# Patient Record
Sex: FEMALE | Race: BLACK OR AFRICAN AMERICAN | NOT HISPANIC OR LATINO | Employment: OTHER | URBAN - METROPOLITAN AREA
[De-identification: names, ages, dates, MRNs, and addresses within clinical notes are randomized per-mention and may not be internally consistent; named-entity substitution may affect disease eponyms.]

---

## 2017-07-14 ENCOUNTER — COMMUNICATION - HEALTHEAST (OUTPATIENT)
Dept: INTERNAL MEDICINE | Facility: CLINIC | Age: 62
End: 2017-07-14

## 2017-07-14 DIAGNOSIS — Z79.899 MEDICATION MANAGEMENT: ICD-10-CM

## 2017-10-10 ENCOUNTER — COMMUNICATION - HEALTHEAST (OUTPATIENT)
Dept: INTERNAL MEDICINE | Facility: CLINIC | Age: 62
End: 2017-10-10

## 2017-10-10 DIAGNOSIS — E78.5 HYPERLIPIDEMIA: ICD-10-CM

## 2017-10-11 ENCOUNTER — COMMUNICATION - HEALTHEAST (OUTPATIENT)
Dept: INTERNAL MEDICINE | Facility: CLINIC | Age: 62
End: 2017-10-11

## 2017-10-11 DIAGNOSIS — Z79.899 MEDICATION MANAGEMENT: ICD-10-CM

## 2017-11-03 ENCOUNTER — RECORDS - HEALTHEAST (OUTPATIENT)
Dept: ADMINISTRATIVE | Facility: OTHER | Age: 62
End: 2017-11-03

## 2017-11-29 ENCOUNTER — OFFICE VISIT - HEALTHEAST (OUTPATIENT)
Dept: INTERNAL MEDICINE | Facility: CLINIC | Age: 62
End: 2017-11-29

## 2017-11-29 DIAGNOSIS — I10 ESSENTIAL HYPERTENSION: ICD-10-CM

## 2017-11-29 DIAGNOSIS — Z78.0 MENOPAUSE: ICD-10-CM
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
## 2017-11-29 DIAGNOSIS — E78.5 HYPERLIPIDEMIA: ICD-10-CM

## 2017-11-29 DIAGNOSIS — Z79.899 MEDICATION MANAGEMENT: ICD-10-CM

## 2017-11-29 DIAGNOSIS — G11.9 PRIMARY CEREBELLAR DEGENERATION (H): ICD-10-CM

## 2017-11-29 DIAGNOSIS — E11.9 TYPE 2 DIABETES MELLITUS WITHOUT COMPLICATION (H): ICD-10-CM

## 2017-11-29 DIAGNOSIS — Z00.00 PREVENTATIVE HEALTH CARE: ICD-10-CM

## 2017-11-29 DIAGNOSIS — E78.00 HYPERCHOLESTEROLEMIA: ICD-10-CM

## 2017-11-29 LAB
CHOLEST SERPL-MCNC: 184 MG/DL
FASTING STATUS PATIENT QL REPORTED: YES
HBA1C MFR BLD: 6.3 % (ref 3.5–6)
HDLC SERPL-MCNC: 61 MG/DL
LDLC SERPL CALC-MCNC: 106 MG/DL
TRIGL SERPL-MCNC: 86 MG/DL

## 2017-11-29 ASSESSMENT — MIFFLIN-ST. JEOR: SCORE: 1375.03

## 2017-11-30 ENCOUNTER — COMMUNICATION - HEALTHEAST (OUTPATIENT)
Dept: INTERNAL MEDICINE | Facility: CLINIC | Age: 62
End: 2017-11-30

## 2017-12-13 ENCOUNTER — COMMUNICATION - HEALTHEAST (OUTPATIENT)
Dept: INTERNAL MEDICINE | Facility: CLINIC | Age: 62
End: 2017-12-13

## 2017-12-20 ENCOUNTER — RECORDS - HEALTHEAST (OUTPATIENT)
Dept: ADMINISTRATIVE | Facility: OTHER | Age: 62
End: 2017-12-20

## 2017-12-20 ENCOUNTER — RECORDS - HEALTHEAST (OUTPATIENT)
Dept: BONE DENSITY | Facility: CLINIC | Age: 62
End: 2017-12-20

## 2017-12-20 ENCOUNTER — RECORDS - HEALTHEAST (OUTPATIENT)
Dept: MAMMOGRAPHY | Facility: CLINIC | Age: 62
End: 2017-12-20

## 2017-12-20 DIAGNOSIS — Z78.0 ASYMPTOMATIC MENOPAUSAL STATE: ICD-10-CM

## 2017-12-20 DIAGNOSIS — Z00.00 ENCOUNTER FOR GENERAL ADULT MEDICAL EXAMINATION WITHOUT ABNORMAL FINDINGS: ICD-10-CM

## 2017-12-27 ENCOUNTER — COMMUNICATION - HEALTHEAST (OUTPATIENT)
Dept: INTERNAL MEDICINE | Facility: CLINIC | Age: 62
End: 2017-12-27

## 2018-01-03 ENCOUNTER — COMMUNICATION - HEALTHEAST (OUTPATIENT)
Dept: INTERNAL MEDICINE | Facility: CLINIC | Age: 63
End: 2018-01-03

## 2018-01-08 ENCOUNTER — RECORDS - HEALTHEAST (OUTPATIENT)
Dept: ADMINISTRATIVE | Facility: OTHER | Age: 63
End: 2018-01-08

## 2018-01-09 ENCOUNTER — RECORDS - HEALTHEAST (OUTPATIENT)
Dept: ADMINISTRATIVE | Facility: OTHER | Age: 63
End: 2018-01-09

## 2018-09-19 ENCOUNTER — COMMUNICATION - HEALTHEAST (OUTPATIENT)
Dept: INTERNAL MEDICINE | Facility: CLINIC | Age: 63
End: 2018-09-19

## 2018-10-31 ENCOUNTER — COMMUNICATION - HEALTHEAST (OUTPATIENT)
Dept: TELEHEALTH | Facility: CLINIC | Age: 63
End: 2018-10-31

## 2018-10-31 ENCOUNTER — OFFICE VISIT - HEALTHEAST (OUTPATIENT)
Dept: INTERNAL MEDICINE | Facility: CLINIC | Age: 63
End: 2018-10-31

## 2018-10-31 DIAGNOSIS — E11.9 DIABETES MELLITUS, TYPE 2 (H): ICD-10-CM

## 2018-10-31 DIAGNOSIS — R42 VERTIGO: ICD-10-CM

## 2018-10-31 DIAGNOSIS — G11.9 PRIMARY CEREBELLAR DEGENERATION (H): ICD-10-CM

## 2018-10-31 DIAGNOSIS — I10 ESSENTIAL HYPERTENSION: ICD-10-CM

## 2018-10-31 DIAGNOSIS — Z79.899 MEDICATION MANAGEMENT: ICD-10-CM

## 2018-10-31 DIAGNOSIS — M25.562 BILATERAL KNEE PAIN: ICD-10-CM

## 2018-10-31 DIAGNOSIS — M25.561 BILATERAL KNEE PAIN: ICD-10-CM

## 2018-10-31 DIAGNOSIS — M25.552 HIP PAIN, LEFT: ICD-10-CM

## 2018-10-31 DIAGNOSIS — E78.00 HYPERCHOLESTEROLEMIA: ICD-10-CM

## 2018-10-31 DIAGNOSIS — M94.9 DISORDER OF BONE AND CARTILAGE: ICD-10-CM

## 2018-10-31 DIAGNOSIS — M89.9 DISORDER OF BONE AND CARTILAGE: ICD-10-CM

## 2018-10-31 DIAGNOSIS — E78.01 FAMILIAL HYPERCHOLESTEROLEMIA: ICD-10-CM

## 2018-10-31 LAB
ALBUMIN SERPL-MCNC: 4.1 G/DL (ref 3.5–5)
ALP SERPL-CCNC: 40 U/L (ref 45–120)
ALT SERPL W P-5'-P-CCNC: 23 U/L (ref 0–45)
ANION GAP SERPL CALCULATED.3IONS-SCNC: 11 MMOL/L (ref 5–18)
AST SERPL W P-5'-P-CCNC: 20 U/L (ref 0–40)
BILIRUB SERPL-MCNC: 0.7 MG/DL (ref 0–1)
BUN SERPL-MCNC: 12 MG/DL (ref 8–22)
CALCIUM SERPL-MCNC: 10.4 MG/DL (ref 8.5–10.5)
CHLORIDE BLD-SCNC: 103 MMOL/L (ref 98–107)
CHOLEST SERPL-MCNC: 187 MG/DL
CO2 SERPL-SCNC: 26 MMOL/L (ref 22–31)
CREAT SERPL-MCNC: 1.1 MG/DL (ref 0.6–1.1)
ERYTHROCYTE [DISTWIDTH] IN BLOOD BY AUTOMATED COUNT: 12.3 % (ref 11–14.5)
GFR SERPL CREATININE-BSD FRML MDRD: 50 ML/MIN/1.73M2
GLUCOSE BLD-MCNC: 127 MG/DL (ref 70–125)
HBA1C MFR BLD: 6.4 % (ref 3.5–6)
HCT VFR BLD AUTO: 43 % (ref 35–47)
HDLC SERPL-MCNC: 66 MG/DL
HGB BLD-MCNC: 14.6 G/DL (ref 12–16)
LDLC SERPL CALC-MCNC: 95 MG/DL
MCH RBC QN AUTO: 29.9 PG (ref 27–34)
MCHC RBC AUTO-ENTMCNC: 33.8 G/DL (ref 32–36)
MCV RBC AUTO: 88 FL (ref 80–100)
PLATELET # BLD AUTO: 200 THOU/UL (ref 140–440)
PMV BLD AUTO: 8.2 FL (ref 7–10)
POTASSIUM BLD-SCNC: 4.3 MMOL/L (ref 3.5–5)
PROT SERPL-MCNC: 7.1 G/DL (ref 6–8)
RBC # BLD AUTO: 4.87 MILL/UL (ref 3.8–5.4)
SODIUM SERPL-SCNC: 140 MMOL/L (ref 136–145)
TRIGL SERPL-MCNC: 131 MG/DL
TSH SERPL DL<=0.005 MIU/L-ACNC: 0.74 UIU/ML (ref 0.3–5)
WBC: 4.2 THOU/UL (ref 4–11)

## 2018-11-01 ENCOUNTER — COMMUNICATION - HEALTHEAST (OUTPATIENT)
Dept: INTERNAL MEDICINE | Facility: CLINIC | Age: 63
End: 2018-11-01

## 2018-11-01 LAB — 25(OH)D3 SERPL-MCNC: 32.1 NG/ML (ref 30–80)

## 2018-12-05 ENCOUNTER — OFFICE VISIT - HEALTHEAST (OUTPATIENT)
Dept: INTERNAL MEDICINE | Facility: CLINIC | Age: 63
End: 2018-12-05

## 2018-12-05 DIAGNOSIS — E78.00 HYPERCHOLESTEROLEMIA: ICD-10-CM

## 2018-12-05 DIAGNOSIS — Z00.00 PREVENTATIVE HEALTH CARE: ICD-10-CM

## 2018-12-05 DIAGNOSIS — G11.9 PRIMARY CEREBELLAR DEGENERATION (H): ICD-10-CM

## 2018-12-05 DIAGNOSIS — E11.9 TYPE 2 DIABETES MELLITUS WITHOUT COMPLICATION, WITHOUT LONG-TERM CURRENT USE OF INSULIN (H): ICD-10-CM

## 2018-12-05 ASSESSMENT — MIFFLIN-ST. JEOR: SCORE: 1346.11

## 2018-12-27 ENCOUNTER — COMMUNICATION - HEALTHEAST (OUTPATIENT)
Dept: INTERNAL MEDICINE | Facility: CLINIC | Age: 63
End: 2018-12-27

## 2019-01-10 ENCOUNTER — COMMUNICATION - HEALTHEAST (OUTPATIENT)
Dept: INTERNAL MEDICINE | Facility: CLINIC | Age: 64
End: 2019-01-10

## 2019-01-10 DIAGNOSIS — R26.9 ABNORMAL GAIT: ICD-10-CM

## 2019-02-04 ENCOUNTER — COMMUNICATION - HEALTHEAST (OUTPATIENT)
Dept: INTERNAL MEDICINE | Facility: CLINIC | Age: 64
End: 2019-02-04

## 2019-02-04 DIAGNOSIS — Z00.00 ROUTINE HEALTH MAINTENANCE: ICD-10-CM

## 2019-02-07 ENCOUNTER — RECORDS - HEALTHEAST (OUTPATIENT)
Dept: ADMINISTRATIVE | Facility: OTHER | Age: 64
End: 2019-02-07

## 2019-03-13 ENCOUNTER — COMMUNICATION - HEALTHEAST (OUTPATIENT)
Dept: INTERNAL MEDICINE | Facility: CLINIC | Age: 64
End: 2019-03-13

## 2019-03-13 DIAGNOSIS — E78.01 FAMILIAL HYPERCHOLESTEROLEMIA: ICD-10-CM

## 2019-03-18 ENCOUNTER — COMMUNICATION - HEALTHEAST (OUTPATIENT)
Dept: INTERNAL MEDICINE | Facility: CLINIC | Age: 64
End: 2019-03-18

## 2019-03-18 DIAGNOSIS — Z79.899 MEDICATION MANAGEMENT: ICD-10-CM

## 2019-04-23 ENCOUNTER — COMMUNICATION - HEALTHEAST (OUTPATIENT)
Dept: INTERNAL MEDICINE | Facility: CLINIC | Age: 64
End: 2019-04-23

## 2019-04-24 ENCOUNTER — AMBULATORY - HEALTHEAST (OUTPATIENT)
Dept: LAB | Facility: CLINIC | Age: 64
End: 2019-04-24

## 2019-04-24 ENCOUNTER — AMBULATORY - HEALTHEAST (OUTPATIENT)
Dept: MULTI SPECIALTY CLINIC | Facility: CLINIC | Age: 64
End: 2019-04-24

## 2019-04-24 DIAGNOSIS — H20.021 RECURRENT ACUTE IRIDOCYCLITIS OF RIGHT EYE: ICD-10-CM

## 2019-04-24 LAB
BASOPHILS # BLD AUTO: 0 THOU/UL (ref 0–0.2)
BASOPHILS NFR BLD AUTO: 0 % (ref 0–2)
EOSINOPHIL # BLD AUTO: 0.1 THOU/UL (ref 0–0.4)
EOSINOPHIL NFR BLD AUTO: 2 % (ref 0–6)
ERYTHROCYTE [DISTWIDTH] IN BLOOD BY AUTOMATED COUNT: 12.5 % (ref 11–14.5)
ERYTHROCYTE [SEDIMENTATION RATE] IN BLOOD BY WESTERGREN METHOD: 10 MM/HR (ref 0–20)
HCT VFR BLD AUTO: 44.5 % (ref 35–47)
HGB BLD-MCNC: 15.1 G/DL (ref 12–16)
LYMPHOCYTES # BLD AUTO: 1.9 THOU/UL (ref 0.8–4.4)
LYMPHOCYTES NFR BLD AUTO: 45 % (ref 20–40)
MCH RBC QN AUTO: 30.7 PG (ref 27–34)
MCHC RBC AUTO-ENTMCNC: 33.8 G/DL (ref 32–36)
MCV RBC AUTO: 91 FL (ref 80–100)
MONOCYTES # BLD AUTO: 0.2 THOU/UL (ref 0–0.9)
MONOCYTES NFR BLD AUTO: 5 % (ref 2–10)
NEUTROPHILS # BLD AUTO: 2 THOU/UL (ref 2–7.7)
NEUTROPHILS NFR BLD AUTO: 48 % (ref 50–70)
PLATELET # BLD AUTO: 224 THOU/UL (ref 140–440)
PMV BLD AUTO: 7.9 FL (ref 7–10)
RBC # BLD AUTO: 4.9 MILL/UL (ref 3.8–5.4)
RHEUMATOID FACT SERPL-ACNC: 16.6 IU/ML (ref 0–30)
WBC: 4.2 THOU/UL (ref 4–11)

## 2019-04-25 LAB
ANA SER QL: 0.5 U
B BURGDOR IGG+IGM SER QL: 0.65 INDEX VALUE
T PALLIDUM AB SER QL: NEGATIVE

## 2019-04-26 LAB — ACE SERPL-CCNC: 27 U/L (ref 9–67)

## 2019-04-29 LAB — ARUP MISCELLANEOUS TEST: NORMAL

## 2019-05-01 LAB
MISCELLANEOUS TEST DEPT. - HE HISTORICAL: NORMAL
PERFORMING LAB: NORMAL
SPECIMEN STATUS: NORMAL
TEST NAME: NORMAL

## 2019-05-03 ENCOUNTER — COMMUNICATION - HEALTHEAST (OUTPATIENT)
Dept: INTERNAL MEDICINE | Facility: CLINIC | Age: 64
End: 2019-05-03

## 2019-05-06 ENCOUNTER — COMMUNICATION - HEALTHEAST (OUTPATIENT)
Dept: INTERNAL MEDICINE | Facility: CLINIC | Age: 64
End: 2019-05-06

## 2019-06-05 ENCOUNTER — OFFICE VISIT - HEALTHEAST (OUTPATIENT)
Dept: INTERNAL MEDICINE | Facility: CLINIC | Age: 64
End: 2019-06-05

## 2019-06-05 DIAGNOSIS — E11.9 TYPE 2 DIABETES MELLITUS (H): ICD-10-CM

## 2019-06-05 DIAGNOSIS — H20.9 IRITIS: ICD-10-CM

## 2019-06-05 DIAGNOSIS — G11.9 PRIMARY CEREBELLAR DEGENERATION (H): ICD-10-CM

## 2019-06-05 LAB
ALBUMIN SERPL-MCNC: 4 G/DL (ref 3.5–5)
ALP SERPL-CCNC: 40 U/L (ref 45–120)
ALT SERPL W P-5'-P-CCNC: 21 U/L (ref 0–45)
ANION GAP SERPL CALCULATED.3IONS-SCNC: 5 MMOL/L (ref 5–18)
AST SERPL W P-5'-P-CCNC: 18 U/L (ref 0–40)
BILIRUB SERPL-MCNC: 0.6 MG/DL (ref 0–1)
BUN SERPL-MCNC: 14 MG/DL (ref 8–22)
CALCIUM SERPL-MCNC: 10.3 MG/DL (ref 8.5–10.5)
CHLORIDE BLD-SCNC: 104 MMOL/L (ref 98–107)
CO2 SERPL-SCNC: 30 MMOL/L (ref 22–31)
CREAT SERPL-MCNC: 0.99 MG/DL (ref 0.6–1.1)
CREAT UR-MCNC: 147.7 MG/DL
GFR SERPL CREATININE-BSD FRML MDRD: 56 ML/MIN/1.73M2
GLUCOSE BLD-MCNC: 74 MG/DL (ref 70–125)
HBA1C MFR BLD: 6.4 % (ref 3.5–6)
MICROALBUMIN UR-MCNC: 0.79 MG/DL (ref 0–1.99)
MICROALBUMIN/CREAT UR: 5.3 MG/G
POTASSIUM BLD-SCNC: 3.8 MMOL/L (ref 3.5–5)
PROT SERPL-MCNC: 6.8 G/DL (ref 6–8)
SODIUM SERPL-SCNC: 139 MMOL/L (ref 136–145)

## 2019-06-05 ASSESSMENT — MIFFLIN-ST. JEOR: SCORE: 1348.83

## 2019-07-12 ENCOUNTER — COMMUNICATION - HEALTHEAST (OUTPATIENT)
Dept: INTERNAL MEDICINE | Facility: CLINIC | Age: 64
End: 2019-07-12

## 2019-09-19 ENCOUNTER — RECORDS - HEALTHEAST (OUTPATIENT)
Dept: ADMINISTRATIVE | Facility: OTHER | Age: 64
End: 2019-09-19

## 2020-01-02 ENCOUNTER — COMMUNICATION - HEALTHEAST (OUTPATIENT)
Dept: SCHEDULING | Facility: CLINIC | Age: 65
End: 2020-01-02

## 2020-01-28 ENCOUNTER — MEDICAL CORRESPONDENCE (OUTPATIENT)
Dept: HEALTH INFORMATION MANAGEMENT | Facility: CLINIC | Age: 65
End: 2020-01-28

## 2020-01-28 ENCOUNTER — OFFICE VISIT - HEALTHEAST (OUTPATIENT)
Dept: INTERNAL MEDICINE | Facility: CLINIC | Age: 65
End: 2020-01-28

## 2020-01-28 DIAGNOSIS — Z79.899 ENCOUNTER FOR LONG-TERM (CURRENT) USE OF MEDICATIONS: ICD-10-CM

## 2020-01-28 DIAGNOSIS — E11.9 TYPE 2 DIABETES MELLITUS WITHOUT COMPLICATION, WITHOUT LONG-TERM CURRENT USE OF INSULIN (H): ICD-10-CM

## 2020-01-28 DIAGNOSIS — E78.01 FAMILIAL HYPERCHOLESTEROLEMIA: ICD-10-CM

## 2020-01-28 DIAGNOSIS — R73.01 IMPAIRED FASTING GLUCOSE: ICD-10-CM

## 2020-01-28 DIAGNOSIS — R55 VASOVAGAL SYNCOPE: ICD-10-CM

## 2020-01-28 DIAGNOSIS — G11.9 PRIMARY CEREBELLAR DEGENERATION (H): ICD-10-CM

## 2020-01-28 DIAGNOSIS — R55 VASOVAGAL SYNCOPE: Primary | ICD-10-CM

## 2020-01-28 DIAGNOSIS — Z79.899 MEDICATION MANAGEMENT: ICD-10-CM

## 2020-01-28 LAB
ERYTHROCYTE [DISTWIDTH] IN BLOOD BY AUTOMATED COUNT: 11.2 % (ref 11–14.5)
HBA1C MFR BLD: 6.4 % (ref 3.5–6)
HCT VFR BLD AUTO: 44.4 % (ref 35–47)
HGB BLD-MCNC: 15.1 G/DL (ref 12–16)
MCH RBC QN AUTO: 31.3 PG (ref 27–34)
MCHC RBC AUTO-ENTMCNC: 34 G/DL (ref 32–36)
MCV RBC AUTO: 92 FL (ref 80–100)
PLATELET # BLD AUTO: 232 THOU/UL (ref 140–440)
PMV BLD AUTO: 7.4 FL (ref 7–10)
RBC # BLD AUTO: 4.82 MILL/UL (ref 3.8–5.4)
WBC: 3.9 THOU/UL (ref 4–11)

## 2020-01-29 LAB
ALBUMIN SERPL-MCNC: 4.1 G/DL (ref 3.5–5)
ALP SERPL-CCNC: 45 U/L (ref 45–120)
ALT SERPL W P-5'-P-CCNC: 23 U/L (ref 0–45)
ANION GAP SERPL CALCULATED.3IONS-SCNC: 12 MMOL/L (ref 5–18)
AST SERPL W P-5'-P-CCNC: 21 U/L (ref 0–40)
BILIRUB DIRECT SERPL-MCNC: 0.2 MG/DL
BILIRUB SERPL-MCNC: 0.4 MG/DL (ref 0–1)
BUN SERPL-MCNC: 12 MG/DL (ref 8–22)
CALCIUM SERPL-MCNC: 10.8 MG/DL (ref 8.5–10.5)
CHLORIDE BLD-SCNC: 104 MMOL/L (ref 98–107)
CO2 SERPL-SCNC: 27 MMOL/L (ref 22–31)
CREAT SERPL-MCNC: 1.1 MG/DL (ref 0.6–1.1)
GFR SERPL CREATININE-BSD FRML MDRD: 50 ML/MIN/1.73M2
GLUCOSE BLD-MCNC: 69 MG/DL (ref 70–125)
POTASSIUM BLD-SCNC: 4 MMOL/L (ref 3.5–5)
PROT SERPL-MCNC: 7.3 G/DL (ref 6–8)
SODIUM SERPL-SCNC: 143 MMOL/L (ref 136–145)

## 2020-01-31 ENCOUNTER — COMMUNICATION - HEALTHEAST (OUTPATIENT)
Dept: INTERNAL MEDICINE | Facility: CLINIC | Age: 65
End: 2020-01-31

## 2020-11-28 ENCOUNTER — COMMUNICATION - HEALTHEAST (OUTPATIENT)
Dept: INTERNAL MEDICINE | Facility: CLINIC | Age: 65
End: 2020-11-28

## 2020-11-28 DIAGNOSIS — Z79.899 MEDICATION MANAGEMENT: ICD-10-CM

## 2020-11-28 DIAGNOSIS — E78.01 FAMILIAL HYPERCHOLESTEROLEMIA: ICD-10-CM

## 2021-02-26 ENCOUNTER — COMMUNICATION - HEALTHEAST (OUTPATIENT)
Dept: ADMINISTRATIVE | Facility: CLINIC | Age: 66
End: 2021-02-26

## 2021-04-08 ENCOUNTER — RECORDS - HEALTHEAST (OUTPATIENT)
Dept: ADMINISTRATIVE | Facility: OTHER | Age: 66
End: 2021-04-08

## 2021-05-05 ENCOUNTER — OFFICE VISIT - HEALTHEAST (OUTPATIENT)
Dept: INTERNAL MEDICINE | Facility: CLINIC | Age: 66
End: 2021-05-05

## 2021-05-05 ENCOUNTER — COMMUNICATION - HEALTHEAST (OUTPATIENT)
Dept: INTERNAL MEDICINE | Facility: CLINIC | Age: 66
End: 2021-05-05

## 2021-05-05 DIAGNOSIS — G11.9 PRIMARY CEREBELLAR DEGENERATION (H): ICD-10-CM

## 2021-05-05 DIAGNOSIS — E11.9 TYPE 2 DIABETES MELLITUS WITHOUT COMPLICATION, WITHOUT LONG-TERM CURRENT USE OF INSULIN (H): ICD-10-CM

## 2021-05-05 DIAGNOSIS — E55.9 VITAMIN D DEFICIENCY: ICD-10-CM

## 2021-05-05 DIAGNOSIS — E89.40 ASYMPTOMATIC POSTPROCEDURAL OVARIAN FAILURE: ICD-10-CM

## 2021-05-05 DIAGNOSIS — N18.31 STAGE 3A CHRONIC KIDNEY DISEASE (H): ICD-10-CM

## 2021-05-05 DIAGNOSIS — I10 ESSENTIAL HYPERTENSION: ICD-10-CM

## 2021-05-05 DIAGNOSIS — R29.6 FALLS FREQUENTLY: ICD-10-CM

## 2021-05-05 DIAGNOSIS — Z00.00 ENCOUNTER FOR PREVENTIVE CARE: ICD-10-CM

## 2021-05-05 LAB
ALBUMIN SERPL-MCNC: 4.2 G/DL (ref 3.5–5)
ALP SERPL-CCNC: 44 U/L (ref 45–120)
ALT SERPL W P-5'-P-CCNC: 38 U/L (ref 0–45)
ANION GAP SERPL CALCULATED.3IONS-SCNC: 9 MMOL/L (ref 5–18)
AST SERPL W P-5'-P-CCNC: 27 U/L (ref 0–40)
BILIRUB SERPL-MCNC: 0.6 MG/DL (ref 0–1)
BUN SERPL-MCNC: 12 MG/DL (ref 8–22)
CALCIUM SERPL-MCNC: 10.1 MG/DL (ref 8.5–10.5)
CHLORIDE BLD-SCNC: 103 MMOL/L (ref 98–107)
CO2 SERPL-SCNC: 28 MMOL/L (ref 22–31)
CREAT SERPL-MCNC: 1.16 MG/DL (ref 0.6–1.1)
CREAT UR-MCNC: 255 MG/DL
ERYTHROCYTE [DISTWIDTH] IN BLOOD BY AUTOMATED COUNT: 15.2 % (ref 11–14.5)
GFR SERPL CREATININE-BSD FRML MDRD: 47 ML/MIN/1.73M2
GLUCOSE BLD-MCNC: 104 MG/DL (ref 70–125)
HBA1C MFR BLD: 6.3 %
HCT VFR BLD AUTO: 46.8 % (ref 35–47)
HGB BLD-MCNC: 15.1 G/DL (ref 12–16)
MCH RBC QN AUTO: 29.1 PG (ref 27–34)
MCHC RBC AUTO-ENTMCNC: 32.3 G/DL (ref 32–36)
MCV RBC AUTO: 90 FL (ref 80–100)
MICROALBUMIN UR-MCNC: 1.54 MG/DL (ref 0–1.99)
MICROALBUMIN/CREAT UR: 6 MG/G
PLATELET # BLD AUTO: 198 THOU/UL (ref 140–440)
PMV BLD AUTO: 9.5 FL (ref 7–10)
POTASSIUM BLD-SCNC: 4 MMOL/L (ref 3.5–5)
PROT SERPL-MCNC: 7.3 G/DL (ref 6–8)
RBC # BLD AUTO: 5.19 MILL/UL (ref 3.8–5.4)
SODIUM SERPL-SCNC: 140 MMOL/L (ref 136–145)
TSH SERPL DL<=0.005 MIU/L-ACNC: 0.62 UIU/ML (ref 0.3–5)
VIT B12 SERPL-MCNC: 659 PG/ML (ref 213–816)
WBC: 4.8 THOU/UL (ref 4–11)

## 2021-05-05 ASSESSMENT — MIFFLIN-ST. JEOR: SCORE: 1392.49

## 2021-05-06 LAB
25(OH)D3 SERPL-MCNC: 56.1 NG/ML (ref 30–80)
CHOLEST SERPL-MCNC: 193 MG/DL
HCV AB SERPL QL IA: NEGATIVE
HDLC SERPL-MCNC: 64 MG/DL
LDLC SERPL CALC-MCNC: 106 MG/DL
TRIGL SERPL-MCNC: 114 MG/DL

## 2021-05-26 ENCOUNTER — RECORDS - HEALTHEAST (OUTPATIENT)
Dept: ADMINISTRATIVE | Facility: OTHER | Age: 66
End: 2021-05-26

## 2021-05-26 ENCOUNTER — RECORDS - HEALTHEAST (OUTPATIENT)
Dept: BONE DENSITY | Facility: CLINIC | Age: 66
End: 2021-05-26

## 2021-05-26 DIAGNOSIS — E55.9 VITAMIN D DEFICIENCY, UNSPECIFIED: ICD-10-CM

## 2021-05-26 DIAGNOSIS — G11.9 HEREDITARY ATAXIA, UNSPECIFIED (H): ICD-10-CM

## 2021-05-26 DIAGNOSIS — R29.6 REPEATED FALLS: ICD-10-CM

## 2021-05-26 DIAGNOSIS — E89.40 ASYMPTOMATIC POSTPROCEDURAL OVARIAN FAILURE: ICD-10-CM

## 2021-05-27 ENCOUNTER — RECORDS - HEALTHEAST (OUTPATIENT)
Dept: ADMINISTRATIVE | Facility: CLINIC | Age: 66
End: 2021-05-27

## 2021-05-27 VITALS
WEIGHT: 183.6 LBS | DIASTOLIC BLOOD PRESSURE: 70 MMHG | HEIGHT: 67 IN | OXYGEN SATURATION: 99 % | BODY MASS INDEX: 28.82 KG/M2 | SYSTOLIC BLOOD PRESSURE: 130 MMHG | HEART RATE: 88 BPM

## 2021-05-28 NOTE — TELEPHONE ENCOUNTER
Please call pt to schedule DM follow up with PCP     Pt can come in for lab only if they would like to come in for labs before appointment

## 2021-05-29 NOTE — PROGRESS NOTES
[facility]    Assessment/Plan:  1. Type 2 diabetes mellitus (H)  Glycohemoglobin at 6.4.  Diet controlled.  On simvastatin, aspirin and blood pressure at goal.  Labs as below.  She is getting regular ophthalmologic care as well.  Recommendations: Continue the same.  - Glycosylated Hemoglobin A1C  - Microalbumin, Random Urine  - Comprehensive Metabolic Panel    2. Primary Cerebellar Degeneration  Followed by neurology.  Of note, this appears to be familial and that her mother and sisters all have this.  However, with recent recurrent iritis, one wonders whether there could be an autoimmune component.  Autoimmune laboratory studies are within normal limits.  Nonetheless, at her convenience, would refer to rheumatology  - Ambulatory referral to Rheumatology    3. Iritis  Ophthalmology notes reviewed.  Consider rheumatologic evaluation  - Ambulatory referral to Rheumatology      Follow-up as needed and quarterly    Cata Doll MD    Chief Complaint:  Chief Complaint   Patient presents with     Follow-up     Diabetes       History of Present Illness:  Rema is a 64 y.o. female who is here today for diabetic follow-up.  Of note, she is a delightful female who has a history of cerebellar degeneration.  This appears to be familial.  She is recently followed up with her neurologist.  She is currently undergoing both physical therapy and will follow up with therapy at the [facility].  She states that doing these 2 therapies simultaneously was very difficult for her.  She is going to stagger them.  Additionally, she reports that she has had recurrent iritis.  The notes from her ophthalmologist are reviewed.  She has had some connective tissue laboratory studies done here that were unremarkable.  Nonetheless, the symptoms have recurred.  She has been on steroid eyedrops.    Her family history is negative for autoimmune disease.  However her mother and 2 sisters have  "had cerebellar degeneration that is progressive.    Additionally, she has type 2 diabetes.  She has been managing with diet control.    Review of Systems:  A comprehensive review of systems was performed and was otherwise negative    PFSH:  Social History: She is single.  She has one her case against the state of [place].  She does not have to pay a fine.  Social History     Tobacco Use   Smoking Status Former Smoker     Last attempt to quit: 1989     Years since quittin.2   Smokeless Tobacco Never Used       Past History: No past medical history on file.    Current Outpatient Medications   Medication Sig Dispense Refill     aspirin 81 mg chewable tablet Chew 81 mg daily.       calcium-vitamin D 500 mg(1,250mg) -200 unit per tablet Take 1 tablet by mouth daily.       cholecalciferol, vitamin D3, (VITAMIN D3) 1,000 unit capsule Take 1,000 Units by mouth daily.       omega-3 fatty acids-vitamin E (FISH OIL) 1,000 mg cap 1 capsule daily.        simvastatin (ZOCOR) 40 MG tablet TAKE 1 TABLET BY MOUTH AT BEDTIME 90 tablet 2     triamterene-hydrochlorothiazide (DYAZIDE) 37.5-25 mg per capsule TAKE ONE CAPSULE BY MOUTH EVERY DAY 90 capsule 1     TURMERIC ORAL Take by mouth.       No current facility-administered medications for this visit.        Family History: Cerebellar degeneration as described    Physical Exam:  General Appearance:   He appears quite well.  She is beaming and vibrant today  Vitals:    19 1237   BP: 132/70   Patient Site: Left Arm   Patient Position: Sitting   Cuff Size: Adult Large   Pulse: 78   SpO2: 97%   Weight: 176 lb 9.6 oz (80.1 kg)   Height: 5' 5.75\" (1.67 m)     Wt Readings from Last 3 Encounters:   19 176 lb 9.6 oz (80.1 kg)   18 176 lb (79.8 kg)   10/31/18 181 lb (82.1 kg)     Body mass index is 28.72 kg/m .    Neck exam is negative  Lungs are clear to auscultation and percussion  Cardiac exam reveals regular rate and rhythm with no murmurs or gallops    Data " Review:    Analysis and Summary of Old Records (2): Viewed ophthalmology records    Records Requested (1):       Other History Summarized (from other people in the room) (2):     Radiology Tests Summarized (XRAY/CT/MRI/DXA) (1):     Labs Reviewed (1): Viewed labs today as well as from April    Medicine Tests Reviewed (EKG/ECHO/COLONOSCOPY/EGD) (1):     Independent Review of EKG or X-RAY (2):

## 2021-05-30 NOTE — TELEPHONE ENCOUNTER
Spoke with pt and gave her phone number for medical records to request those records be faxed to rheumatology.

## 2021-05-30 NOTE — TELEPHONE ENCOUNTER
Who is calling:  Patient   Reason for Call:  Patient is requesting to fax her medical records to rheumatology office in [place] . Fax # [phone].  Date of last appointment with primary care: 6/5/19  Okay to leave a detailed message: No

## 2021-05-31 ENCOUNTER — COMMUNICATION - HEALTHEAST (OUTPATIENT)
Dept: INTERNAL MEDICINE | Facility: CLINIC | Age: 66
End: 2021-05-31

## 2021-05-31 VITALS — WEIGHT: 185 LBS | BODY MASS INDEX: 30.82 KG/M2 | HEIGHT: 65 IN

## 2021-06-02 ENCOUNTER — RECORDS - HEALTHEAST (OUTPATIENT)
Dept: ADMINISTRATIVE | Facility: CLINIC | Age: 66
End: 2021-06-02

## 2021-06-02 VITALS — BODY MASS INDEX: 30.12 KG/M2 | WEIGHT: 181 LBS

## 2021-06-02 VITALS — BODY MASS INDEX: 28.28 KG/M2 | WEIGHT: 176 LBS | HEIGHT: 66 IN

## 2021-06-02 VITALS — HEIGHT: 66 IN | BODY MASS INDEX: 28.38 KG/M2 | WEIGHT: 176.6 LBS

## 2021-06-04 VITALS
BODY MASS INDEX: 28.75 KG/M2 | HEART RATE: 88 BPM | OXYGEN SATURATION: 98 % | DIASTOLIC BLOOD PRESSURE: 74 MMHG | SYSTOLIC BLOOD PRESSURE: 132 MMHG | WEIGHT: 176.8 LBS

## 2021-06-04 NOTE — TELEPHONE ENCOUNTER
"She is calling with \"symptoms of heat stroke\".  Had fainting spell and dizziness and called paramedics.    Happened omar rob.    No symptoms since Tuesday.  Had \"a hundred people over and the oven on\".    No dizziness now or currently symptoms.    She agrees to call back if symptoms return.    Yoko Fernandez RN Triage and refills    "

## 2021-06-05 ENCOUNTER — RECORDS - HEALTHEAST (OUTPATIENT)
Dept: BONE DENSITY | Facility: CLINIC | Age: 66
End: 2021-06-05

## 2021-06-05 DIAGNOSIS — M89.9 DISORDER OF BONE AND CARTILAGE: ICD-10-CM

## 2021-06-05 DIAGNOSIS — M94.9 DISORDER OF BONE AND CARTILAGE: ICD-10-CM

## 2021-06-05 NOTE — PROGRESS NOTES
Counts include 234 beds at the [facility] Follow Up Note    Assessment/Plan:  1. Vasovagal syncope  Syncopal event occurred on Dana day.  Likely secondary to crowded house, warm temperatures, and alcoholic beverage and dehydration.  No further occurrences.  However, history of cerebellar degeneration.  Some falls and occasional lightheadedness.  Recommendation: Keep hydrated.  Would like to proceed with both an echocardiogram and a carotid ultrasound to rule out any confounding factors.  Follow-up in 2 to 3 months  - HM2(CBC w/o Differential)  - Basic Metabolic Panel  - US Carotid Bilateral; Future  - Echo Complete; Future    2. Medication management  We will renew medications  - triamterene-hydrochlorothiazide (DYAZIDE) 37.5-25 mg per capsule; TAKE ONE CAPSULE BY MOUTH EVERY DAY  Dispense: 90 capsule; Refill: 3  - Hepatic Profile    3. Familial hypercholesterolemia  On Zocor.  Will renew medications  - simvastatin (ZOCOR) 40 MG tablet; TAKE 1 TABLET BY MOUTH AT BEDTIME  Dispense: 90 tablet; Refill: 3      4. Impaired fasting glucose/type 2 diabetes-with hyperglycemia and on no medications  Diet controlled type 2 diabetes with hyperglycemia.  Of note, she denies any lightheadedness or polyuria or polydipsia.  She has given up on alcohol and wine ingestion.  She is hoping her sugars will be improved.  Recommendation: We will update glycohemoglobin.  Consideration for diabetic education and an oral medication.  Will discuss at follow-up visit  - Glycosylated Hemoglobin A1c          Cata Doll MD    Chief Complaint:  Chief Complaint   Patient presents with     Dizziness     Follow-up       History of Present Illness:  Rema is a 64 y.o. female who is here today for follow-up after having an episode of syncope on Dana day.  She states that she was in her kitchen with a lot of people.  It was very crowded as she was entertaining gas.  She states the temperature was somewhat elevated as the oven was on.  She had her  "coat on and was ready to step outside.  She developed some lightheadedness with queasiness and had a controlled \"faint \".  She states she was not entirely unconscious.  She could hear people talking and yelling at her.  She states that when EMS arrived, her blood pressure was elevated.  She was drinking cold water and had ice packs on her head.  These things helped her to feel better.  She attributed her symptoms to just the heat and prolonged standing.  Of note, she did not have any associated headaches, chest pain etc.  She did not go to the hospital.  She has not had any further episodes.    She does have a history of cerebellar degeneration.  She states she has had some falls.  She did slip on the ice on one occasion.  She has occasional lightheadedness.  She has not been to the neurologist recently.    She is currently experiencing a recurrent episode of iritis.  She has not yet seen the rheumatologist    Her history is also significant for impaired fasting glucose/type 2 diabetes.  She is on an aspirin and statin medication.  She has not yet been started on any medications as she believes she can control this with diet alone.  She states she has given up alcohol.  She feels that her sugar should be better.    She is not able to exercise regularly.    Review of Systems:  A comprehensive review of systems was performed and was otherwise negative    PFSH:  Social History: She is single.  She has adult children.  Social History     Tobacco Use   Smoking Status Former Smoker     Last attempt to quit: 1989     Years since quittin.9   Smokeless Tobacco Never Used       Past History: No past medical history on file.    Current Outpatient Medications   Medication Sig Dispense Refill     aspirin 81 mg chewable tablet Chew 81 mg daily.       calcium-vitamin D 500 mg(1,250mg) -200 unit per tablet Take 1 tablet by mouth daily.       cholecalciferol, vitamin D3, (VITAMIN D3) 1,000 unit capsule Take 1,000 Units by " mouth daily.       simvastatin (ZOCOR) 40 MG tablet TAKE 1 TABLET BY MOUTH AT BEDTIME 90 tablet 3     triamterene-hydrochlorothiazide (DYAZIDE) 37.5-25 mg per capsule TAKE ONE CAPSULE BY MOUTH EVERY DAY 90 capsule 3     TURMERIC ORAL Take by mouth.       No current facility-administered medications for this visit.        Family History:     Physical Exam:  General Appearance:   She is well in appearance and in no acute distress  Vitals:    01/28/20 1240   BP: 132/74   Pulse: 88   SpO2: 98%   Weight: 176 lb 12.8 oz (80.2 kg)     Wt Readings from Last 3 Encounters:   01/28/20 176 lb 12.8 oz (80.2 kg)   06/05/19 176 lb 9.6 oz (80.1 kg)   12/05/18 176 lb (79.8 kg)     Body mass index is 28.75 kg/m .    EYES: Eyelids, conjunctiva, and sclera were normal. Pupils were normal. Cornea, iris, and lens were normal bilaterally.  HEAD, EARS, NOSE, MOUTH, AND THROAT: Head and face were normal. Hearing was normal to voice and the ears were normal to external exam. Nose appearance was normal and there was no discharge. Oropharynx was normal.  TMs were normal.  NECK: Neck appearance was normal. There were no neck masses and the thyroid was not enlarged.  RESPIRATORY: Breathing pattern was normal and the chest moved symmetrically.   Lung sounds were equal bilaterally.  CARDIOVASCULAR: Heart rate and rhythm were normal.  S1 and S2 were normal and there were no extra sounds or murmurs. Peripheral pulses in arms and legs were normal.  Jugular venous pressure was normal.  There was no peripheral edema.  GASTROINTESTINAL: The abdomen was normal in contour.  Bowel sounds were present.   Palpation detected no tenderness, mass, or enlarged organs.   MUSCULOSKELETAL: Skeletal configuration was normal and muscle mass was normal for age. Joint appearance was overall normal.  LYMPHATIC: There were no enlarged nodes.  SKIN/HAIR/NAILS: Skin color was normal.  There were no abnormal skin lesions.  Hair and nails were normal.  NEUROLOGIC: The  patient was alert and oriented to person, place, time, and circumstance. Speech was normal. Cranial nerves were normal. Motor strength was normal for age. The patient was normally coordinated.  PSYCHIATRIC:  Mood and affect were normal and the patient had normal recent and remote memory. The patient's judgment and insight were normal.          Data Review:    Analysis and Summary of Old Records (2): Reviewed care connection notes    Records Requested (1):       Other History Summarized (from other people in the room) (2):     Radiology Tests Summarized (XRAY/CT/MRI/DXA) (1):     Labs Reviewed (1): Ordered and reviewed labs.  Glycohemoglobin 6.4    Medicine Tests Reviewed (EKG/ECHO/COLONOSCOPY/EGD) (1): Ordered echo/carotid ultrasound    Independent Review of EKG or X-RAY (2):

## 2021-06-13 NOTE — TELEPHONE ENCOUNTER
Refill Approved    Rx renewed per Medication Renewal Policy. Medication was last renewed on 1/28/20, last OV 1/28/20.    Natty Vincent, Care Connection Triage/Med Refill 11/30/2020     Requested Prescriptions   Pending Prescriptions Disp Refills     simvastatin (ZOCOR) 40 MG tablet [Pharmacy Med Name: SIMVASTATIN 40 MG Tablet] 90 tablet 3     Sig: TAKE 1 TABLET AT BEDTIME       Statins Refill Protocol (Hmg CoA Reductase Inhibitors) Passed - 11/28/2020  1:29 PM        Passed - PCP or prescribing provider visit in past 12 months      Last office visit with prescriber/PCP: 1/28/2020 Cata Doll MD OR same dept: 1/28/2020 Cata Doll MD OR same specialty: 1/28/2020 Cata Doll MD  Last physical: 12/5/2018 Last MTM visit: Visit date not found   Next visit within 3 mo: Visit date not found  Next physical within 3 mo: Visit date not found  Prescriber OR PCP: Cata Doll MD  Last diagnosis associated with med order: 1. Familial hypercholesterolemia  - simvastatin (ZOCOR) 40 MG tablet [Pharmacy Med Name: SIMVASTATIN 40 MG Tablet]; TAKE 1 TABLET AT BEDTIME  Dispense: 90 tablet; Refill: 3    2. Medication management  - triamterene-hydrochlorothiazide (DYAZIDE) 37.5-25 mg per capsule [Pharmacy Med Name: TRIAMTERENE/HYDROCHLOROTHIAZIDE 37.5-25 MG Capsule]; TAKE 1 CAPSULE EVERY DAY  Dispense: 90 capsule; Refill: 3    If protocol passes may refill for 12 months if within 3 months of last provider visit (or a total of 15 months).                triamterene-hydrochlorothiazide (DYAZIDE) 37.5-25 mg per capsule [Pharmacy Med Name: TRIAMTERENE/HYDROCHLOROTHIAZIDE 37.5-25 MG Capsule] 90 capsule 3     Sig: TAKE 1 CAPSULE EVERY DAY       Diuretics/Combination Diuretics Refill Protocol  Passed - 11/28/2020  1:29 PM        Passed - Visit with PCP or prescribing provider visit in past 12 months     Last office visit with prescriber/PCP: 1/28/2020 Cata Doll MD OR same dept: 1/28/2020 Cata Doll  MD Vickie OR same specialty: 1/28/2020 Cata Doll MD  Last physical: 12/5/2018 Last MTM visit: Visit date not found   Next visit within 3 mo: Visit date not found  Next physical within 3 mo: Visit date not found  Prescriber OR PCP: Cata Doll MD  Last diagnosis associated with med order: 1. Familial hypercholesterolemia  - simvastatin (ZOCOR) 40 MG tablet [Pharmacy Med Name: SIMVASTATIN 40 MG Tablet]; TAKE 1 TABLET AT BEDTIME  Dispense: 90 tablet; Refill: 3    2. Medication management  - triamterene-hydrochlorothiazide (DYAZIDE) 37.5-25 mg per capsule [Pharmacy Med Name: TRIAMTERENE/HYDROCHLOROTHIAZIDE 37.5-25 MG Capsule]; TAKE 1 CAPSULE EVERY DAY  Dispense: 90 capsule; Refill: 3    If protocol passes may refill for 12 months if within 3 months of last provider visit (or a total of 15 months).             Passed - Serum Potassium in past 12 months      Lab Results   Component Value Date    Potassium 4.0 01/28/2020             Passed - Serum Sodium in past 12 months      Lab Results   Component Value Date    Sodium 143 01/28/2020             Passed - Blood pressure on file in past 12 months     BP Readings from Last 1 Encounters:   01/28/20 132/74             Passed - Serum Creatinine in past 12 months      Creatinine   Date Value Ref Range Status   01/28/2020 1.10 0.60 - 1.10 mg/dL Final

## 2021-06-14 NOTE — PROGRESS NOTES
Assessment and Plan:     1. Preventative health care  Due for mammography and bone densitometry.  Colonoscopy was updated in 2016.  Immunizations will be updated today.  Ophthalmologic care scheduled for the next couple of months.  Dental care is annually.  - Mammo Screening Bilateral; Future    2. Type 2 diabetes mellitus without complication  She is on no medications but has impaired fasting glucose/type 2 diabetes.  Will update labs.  Have discussed the importance of a healthy diet and eliminating simple sugars.  - Glycosylated Hemoglobin A1c    3. Hypertension  At goal    4. Hypercholesterolemia  We will update labs  - Comprehensive Metabolic Panel  - Lipid Cascade FASTING  - HM2(CBC w/o Differential)    5. Primary cerebellar degeneration  Approximately 4-5 falls per month.  She feels that her movement disorder is stable.  Recommendation: Encourage follow-up with neurology.  Will refer to physical therapy for further assessment of gait stability  - Vitamin D, Total (25-Hydroxy)  - Vitamin B12  - Ambulatory referral to Physical Therapy      Cata Doll MD  11/29/2017    Chief Complaint:  Chief Complaint   Patient presents with     Annual Exam       History of Present Illness:  Rema is a 62 y.o. female who is here today for her annual examination.  Of note, she has really no chief complaints today.  She does have a cerebellar degenerative disorder.  She states that she believes her gait is stable.  She falls about 4-5 times per month and this is consistent with what she has experienced in the past.  She did find physical therapy in the past helpful.  She would like to proceed with this again.    Health maintenance is reviewed.  She did have a colonoscopy done in 2016.  She is due for mammography and bone densitometry.  Immunizations will be updated today.  Ophthalmologic care is due.  Dental care is done annually.    She believes her diet is fairly healthy.  She does have desserts every couple of days.   "She is aware of the issue with type 2 diabetes.    Review of Systems:    The rest of the review of systems are negative for all systems.    PFSH:  Social History: She is single and lives in her own home.  She is retired  History   Smoking Status     Former Smoker     Quit date: 2/23/1989   Smokeless Tobacco     Not on file       Past Medical History:    No Known Allergies    Family History: Many family members have type 2 diabetes  Family History   Problem Relation Age of Onset     Goiter       Multinodular     Diabetes type II       Ataxia       Spinal Cerebellar Ataxia      Breast cancer Neg Hx      Colon cancer         Physical Exam:  Vitals:    11/29/17 1402   BP: 136/74   Patient Site: Left Arm   Patient Position: Sitting   Cuff Size: Adult Regular   Pulse: 72   Weight: 185 lb (83.9 kg)   Height: 5' 5\" (1.651 m)     Wt Readings from Last 3 Encounters:   11/29/17 185 lb (83.9 kg)   09/19/16 189 lb (85.7 kg)   07/21/16 183 lb (83 kg)       General Appearance:  Alert, cooperative, no distress, appears stated age   Head:  Normocephalic, without obvious abnormality, atraumatic   Eyes:  PERRL, conjunctiva/corneas clear, EOM's intact   Ears:  Normal TM's and external ear canals, both ears   Nose: Nares normal, septum midline,mucosa normal, no drainage    Throat: Lips, mucosa, and tongue normal; teeth and gums normal   Neck: Supple, symmetrical, trachea midline, no adenopathy;  thyroid: not enlarged, symmetric, no tenderness/mass/nodules; no carotid bruit or JVD   Back:   Symmetric, no curvature, ROM normal, no CVA tenderness   Lungs:   Clear to auscultation bilaterally, respirations unlabored   Heart:  Regular rate and rhythm, S1 and S2 normal, no murmur, rub, or gallop   Abdomen:   Soft, non-tender, bowel sounds active all four quadrants,  no masses, no organomegaly, no hernias    Extremities: Extremities normal, atraumatic, no cyanosis or edema   Skin: Skin color, texture, turgor normal, no rashes or lesions   Lymph " nodes: Cervical, supraclavicular, and axillary nodes normal   Neurologic: Normal, CN 2-12 intact                                 Breasts:  Normal exam no masses, nipple discharge or axillary adenopathy    Medications:  Current Outpatient Prescriptions   Medication Sig Dispense Refill     aspirin 81 mg chewable tablet Chew 81 mg daily.       calcium-vitamin D 500 mg(1,250mg) -200 unit per tablet Take 1 tablet by mouth daily.       cholecalciferol, vitamin D3, (VITAMIN D3) 1,000 unit capsule Take 1,000 Units by mouth daily.       estradiol (VAGIFEM) 10 mcg Tab Insert 1 tablet (10 mcg total) into the vagina every other day. 45 tablet 3     glucosamine-chondroitin 500-400 mg cap Take 1 capsule by mouth once.       omega-3 fatty acids-vitamin E (FISH OIL) 1,000 mg cap 1 capsule daily.        simvastatin (ZOCOR) 40 MG tablet TAKE 1 TABLET BY MOUTH AT BEDTIME 90 tablet 3     triamterene-hydrochlorothiazide (DYAZIDE) 37.5-25 mg per capsule TAKE ONE CAPSULE BY MOUTH EVERY DAY 90 capsule 3     No current facility-administered medications for this visit.        Immunizations:  Immunization History   Administered Date(s) Administered     Influenza, Seasonal, Inj PF IIV3 01/18/2013     Influenza, inj, historic,unspecified 12/02/2003, 11/25/2009, 01/13/2012     Influenza, seasonal,quad inj 36+ mos 09/19/2016, 11/29/2017     Influenza, seasonal,quad inj 6-35 mos 11/01/2013, 10/31/2014     Pneumo Conj 13-V (2010&after) 11/29/2017     Pneumo Polysac 23-V 05/25/2012     Td,adult,historic,unspecified 03/17/2006     Tdap 07/21/2016

## 2021-06-15 NOTE — TELEPHONE ENCOUNTER
Reason for Call:  Other call back      Detailed comments: pt is trying to have a procedure-laser hair removal and was advised that since she is on triamterene-hydrochlorothiazide that she needs the ok from Dr Doll.    DOes she need to stop the meds or ok to take pre and post.a    Phone Number Patient can be reached at:   Cell number on file:    Telephone Information:   Mobile [phone]       Best Time: any    Can we leave a detailed message on this number?: Yes    Call taken on 2/26/2021 at 4:22 PM by Pamela Behr

## 2021-06-15 NOTE — TELEPHONE ENCOUNTER
Left message to call back for: Rema  Information to relay to patient:  Please give message from Dr. Doll.

## 2021-06-16 PROBLEM — N18.30 CHRONIC KIDNEY DISEASE, STAGE 3 (H): Status: ACTIVE | Noted: 2021-05-05
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
## 2021-06-19 NOTE — LETTER
Letter by Graeme Jefferson MD at      Author: Graeme Jefferson MD Service: -- Author Type: --    Filed:  Encounter Date: 5/6/2019 Status: (Other)         Rema Clarke  [address] [facility] [address]             May 6, 2019         Dear Ms. Clarke,    Below are the results from your recent visit:    Resulted Orders   Sedimentation Rate   Result Value Ref Range    Sed Rate 10 0 - 20 mm/hr   Antinuclear Antibodies Screen (NOEL)   Result Value Ref Range    NOEL Screen Cascade 0.5 <=2.9 U    Narrative    <1.0 negative  1.1-2.9 weakly positive  3.0-5.9 positive ( reflex)  > or=6.0 strongly positive   Syphilis Screen, Cascade   Result Value Ref Range    Treponema Antibody (Syphilis) Negative Negative   Rheumatoid Factor Screen   Result Value Ref Range    Rheumatoid Factor Quantitative 16.6 0 - 30 IU/mL   Angiotensin Converting Enzyme (ACE)   Result Value Ref Range    Angiotensin Converting Enzyme 27 9 - 67 U/L      Comment:      Performed by [facility],  [address] [place] [id number] [phone]  [email or web address], Casper Blackwood MD, Lab. Director   Lyme Antibody Cascade   Result Value Ref Range    Lyme Antibody Cascade 0.65 <0.90 Index Value    Narrative    Interpretation of Lyme Disease Total Antibody (IgG/IgM)  <0.90 Test Value=Negative  No detectable antibodies to B. burgdorferi. Patients  in early stages of infection may not produce  detectable levels of antibody. Antibiotic therapy  in early disease may prevent antibody production  from reaching detectable levels. Patients with  clinical history and/or symptoms suggestive of Lyme  disease but with negative test results should be  retested in 2-4 weeks.  0.90-<1.10 Test Value=Borderline  Suggests the presence of antibodies to B.  burgdorferi. Recommend repeat collection in 2-4  weeks.  >=1.10 Test Value=Positive  Indicates the presence of antibodies to B.  burgdorferi. False positive results can occur with  sera from syphilis patients. Cross-reactivity  may  occur with relapsing fever, [place] Spotted  fever, other spirochetal diseases, erythematosus,  EBV infection, or CMV infection. Clinical symptoms,  epidemiology of the case and other laboratory tests  should allow for distinction of these conditions from  Lyme disease.   HM1 (CBC with Diff)   Result Value Ref Range    WBC 4.2 4.0 - 11.0 thou/uL    RBC 4.90 3.80 - 5.40 mill/uL    Hemoglobin 15.1 12.0 - 16.0 g/dL    Hematocrit 44.5 35.0 - 47.0 %    MCV 91 80 - 100 fL    MCH 30.7 27.0 - 34.0 pg    MCHC 33.8 32.0 - 36.0 g/dL    RDW 12.5 11.0 - 14.5 %    Platelets 224 140 - 440 thou/uL    MPV 7.9 7.0 - 10.0 fL    Neutrophils % 48 (L) 50 - 70 %    Lymphocytes % 45 (H) 20 - 40 %    Monocytes % 5 2 - 10 %    Eosinophils % 2 0 - 6 %    Basophils % 0 0 - 2 %    Neutrophils Absolute 2.0 2.0 - 7.7 thou/uL    Lymphocytes Absolute 1.9 0.8 - 4.4 thou/uL    Monocytes Absolute 0.2 0.0 - 0.9 thou/uL    Eosinophils Absolute 0.1 0.0 - 0.4 thou/uL    Basophils Absolute 0.0 0.0 - 0.2 thou/uL       Rema: I am responding for  since  she is out of the clinic.  Labs have returned in the normal range.    Please call with questions or contact us using [employer].    Sincerely,        Electronically signed by Graeme Jefferson MD

## 2021-06-19 NOTE — LETTER
Letter by Cata Doll MD at      Author: Cata Doll MD Service: -- Author Type: --    Filed:  Encounter Date: 4/23/2019 Status: (Other)         May 21, 2019    Rema Clarke  [address] [facility] [address]      Dear Rema,    As a valued [facility] patient, your healthcare needs are our priority.  Your primary care provider requested that we reach out to you as we have been unable to reach you by phone, as upon review of your chart for diabetes management, we noticed that you are overdue for your diabetes labs and a follow up with your primary care provider. Please call our office and schedule a diabetic follow up with your primary care provider at your earliest convenience.     To help prevent delays in your care, please call the [facility] at [phone].    We look forward to partnering with you to achieve optimal health and wellbeing.    Sincerely,  Clinical Staff at [facility]       Thank you,  Cata Doll MD

## 2021-06-20 NOTE — LETTER
Letter by Cata Doll MD at      Author: Cata Doll MD Service: -- Author Type: --    Filed:  Encounter Date: 1/31/2020 Status: (Other)         Rema Clarke  [address] [facility] [address]             January 31, 2020         Dear Ms. Clarke,    Below are the results from your recent visit:    Resulted Orders   HM2(CBC w/o Differential)   Result Value Ref Range    WBC 3.9 (L) 4.0 - 11.0 thou/uL    RBC 4.82 3.80 - 5.40 mill/uL    Hemoglobin 15.1 12.0 - 16.0 g/dL    Hematocrit 44.4 35.0 - 47.0 %    MCV 92 80 - 100 fL    MCH 31.3 27.0 - 34.0 pg    MCHC 34.0 32.0 - 36.0 g/dL    RDW 11.2 11.0 - 14.5 %    Platelets 232 140 - 440 thou/uL    MPV 7.4 7.0 - 10.0 fL   Basic Metabolic Panel   Result Value Ref Range    Sodium 143 136 - 145 mmol/L    Potassium 4.0 3.5 - 5.0 mmol/L    Chloride 104 98 - 107 mmol/L    CO2 27 22 - 31 mmol/L    Anion Gap, Calculation 12 5 - 18 mmol/L    Glucose 69 (L) 70 - 125 mg/dL    Calcium 10.8 (H) 8.5 - 10.5 mg/dL    BUN 12 8 - 22 mg/dL    Creatinine 1.10 0.60 - 1.10 mg/dL    GFR MDRD Af Amer 60 (L) >60 mL/min/1.73m2    GFR MDRD Non Af Amer 50 (L) >60 mL/min/1.73m2    Narrative    Fasting Glucose reference range is 70-99 mg/dL per  American Diabetes Association (ADA) guidelines.   Glycosylated Hemoglobin A1c   Result Value Ref Range    Hemoglobin A1c 6.4 (H) 3.5 - 6.0 %   Hepatic Profile   Result Value Ref Range    Bilirubin, Total 0.4 0.0 - 1.0 mg/dL    Bilirubin, Direct 0.2 <=0.5 mg/dL    Protein, Total 7.3 6.0 - 8.0 g/dL    Albumin 4.1 3.5 - 5.0 g/dL    Alkaline Phosphatase 45 45 - 120 U/L    AST 21 0 - 40 U/L    ALT 23 0 - 45 U/L       Rema, for the most part, your blood work is stable.  I am still waiting on the test results.  Your diabetes is inching upward.  We should consider, perhaps, starting you on a medication.  If you are able to work a bit harder on diet i.e. with reduction of starches, sugars and sweets, this would be helpful.  Do want to do some  diabetic reeducation?  We can help you arrange that.  Please let me know.    Please call with questions or contact us using [employer].    Sincerely,        Electronically signed by Cata Doll MD

## 2021-06-21 NOTE — PROGRESS NOTES
[facility] Follow Up Note    Assessment/Plan:  1. Diabetes mellitus, type 2 (H)  At goal with glycohemoglobin at 6.4.  Continue dietary measures and attempt to keep physically active despite ongoing physical limitations  - Glycosylated Hemoglobin A1c  - JIC LAV  - JI RED  - Comprehensive Metabolic Panel    2. Hypercholesterolemia  We will update labs  - Comprehensive Metabolic Panel  - Thyroid Stimulating Hormone (TSH)  - HM2(CBC w/o Differential)  - Lipid Cascade; Future  - Lipid Cascade    3. Hypertension  At goal with blood pressure under 140/90    4. Primary Cerebellar Degeneration  Progressive cerebellar degeneration.  She has frequent falls per day.  Encourage neurology follow-up.  Will discuss at comprehensive visit    5. Osteopenia  We will update vitamin D  - Vitamin D, Total (25-Hydroxy)    6. Vertigo  Some concern regarding peripheral vertigo despite primary cerebellar degeneration.  Her [facility]  She would like to have a visit there as well as she has some vertigo.  Recommendation  - Ambulatory referral to PT/OT      7 Hip pain, left  Left hip pain with bilateral knee pain made worse with stairs.  Likely underlying DJD  - Ambulatory referral to Orthopedics    8. Bilateral knee pain  As above  - Ambulatory referral to Orthopedics      Follow-up for physical    Cata Doll MD    Chief Complaint:  Chief Complaint   Patient presents with     Follow-up     Diabetes       History of Present Illness:  Rema is a 63 y.o. female who is here today for a diabetic checkup.  Of note, she was notified as she is overdue for her A1c.  She states that overall she is doing well.  She has a variety of issues that we will take up at her physical.  Of note, she has some left hip pain with bilateral knee pain.  It is made worse with stair climbing.  She states that occurs intermittently.  She has not tried any palliative measures.    Also, she states that she  experiences occasional vertigo.  This is fairly chronic for her.  She states her [facility].  She is hoping that she could have an opportunity to visit there.  Her vertigo may be from her cerebellar degeneration, but she is hoping that there is a peripheral component as well.  She describes some positional vertigo.    She has no other major concerns.    Review of Systems:  A comprehensive review of systems was performed and was otherwise negative    PFSH:  Social History: She is single and lives independently.  History   Smoking Status     Former Smoker     Quit date: 2/23/1989   Smokeless Tobacco     Never Used       Past History: Primary cerebellar degeneration-familial  Current Outpatient Prescriptions   Medication Sig Dispense Refill     aspirin 81 mg chewable tablet Chew 81 mg daily.       calcium-vitamin D 500 mg(1,250mg) -200 unit per tablet Take 1 tablet by mouth daily.       cholecalciferol, vitamin D3, (VITAMIN D3) 1,000 unit capsule Take 1,000 Units by mouth daily.       estradiol (VAGIFEM) 10 mcg Tab Insert 1 tablet (10 mcg total) into the vagina every other day. 45 tablet 3     glucosamine-chondroitin 500-400 mg cap Take 1 capsule by mouth once.       omega-3 fatty acids-vitamin E (FISH OIL) 1,000 mg cap 1 capsule daily.        simvastatin (ZOCOR) 40 MG tablet TAKE 1 TABLET BY MOUTH AT BEDTIME 90 tablet 3     triamterene-hydrochlorothiazide (DYAZIDE) 37.5-25 mg per capsule TAKE ONE CAPSULE BY MOUTH EVERY DAY 90 capsule 3     No current facility-administered medications for this visit.        Family History: For family members are exhibiting symptoms of  primary cerebellar degeneration    Physical Exam:  General Appearance:   She appears well and in no acute distress  Vitals:    10/31/18 1417   BP: 136/64   Patient Site: Left Arm   Patient Position: Sitting   Cuff Size: Adult Large   Pulse: (!) 102   SpO2: 98%   Weight: 181 lb (82.1  kg)     Wt Readings from Last 3 Encounters:   10/31/18 181 lb (82.1 kg)   11/29/17 185 lb (83.9 kg)   09/19/16 189 lb (85.7 kg)     Body mass index is 30.12 kg/(m^2).    Head neck exam is negative  Lungs are clear to auscultation and percussion  Cardiac exam reveals regular rate and rhythm with no murmurs or gallops

## 2021-06-22 NOTE — PROGRESS NOTES
Assessment and Plan:   Annual wellness forms reviewed.  Because of her gait and cerebellar degeneration disorder, she does need some specific help and orders are placed.    1. Preventative health care  Up-to-date on colonoscopy and bone densitometry.  Needs mammogram.  Immunizations reviewed and will update shingles vaccination.  Also, strongly emphasized need to follow-up with neurologist and ophthalmologist.  Recommendations: Continue to try to be active but remain safe within the home.    2. Type 2 diabetes mellitus without complication, without long-term current use of insulin (H)  Glycohemoglobin at 6.4.  She is going to continue to work with diet and would like to avoid medications at this juncture.  Would like to see her back for follow-up in 4 months    3. Hypercholesterolemia  At goal on current medication.    4. Primary Cerebellar Degeneration  Encourage follow-up with her neurologist.  Orders placed for [facility] with regard to gait instability for physical therapy    The patient's current medical problems were reviewed.    She is up-to-date with regard to living well  The following health maintenance schedule was reviewed with the patient and provided in printed form in the after visit summary:   Health Maintenance   Topic Date Due     DIABETES OPHTHALMOLOGY EXAM  04/14/1965     ZOSTER VACCINES (1 of 2) 04/14/2004     DIABETES URINE MICROALBUMIN  04/22/2016     DIABETES HEMOGLOBIN A1C  04/30/2019     DIABETES FOLLOW-UP  04/30/2019     DIABETES FOOT EXAM  10/31/2019     MAMMOGRAM  01/08/2020     ADVANCE DIRECTIVES DISCUSSED WITH PATIENT  02/23/2020     PAP SMEAR  07/21/2021     TD 18+ HE  07/21/2026     COLONOSCOPY  12/06/2026     INFLUENZA VACCINE RULE BASED  Completed     TDAP ADULT ONE TIME DOSE  Completed        Subjective:   Chief Complaint: Rema Clarke is an 63 y.o. female here for an Annual Wellness visit.   HPI: Rema is a delightful 63-year-old female here for health  maintenance examination.  Of note, her blood pressure is elevated as she has a court case tomorrow.  She is representing her friend in a lawsuit.  She is experiencing stress about the situation.    She otherwise is doing well.  She vows to do better with regard to diet and her diabetes.  She has resumed taking her vitamin D as was recommended after last visit.  She has not yet scheduled with the orthopedic surgeon or the [facility] balance [facility].  She is also due for a visit with her neurologist with regard to her cerebellar degeneration syndrome.  She does state that she has falls around the house.  She is looking forward to working with a physical therapist on this.  She denies any bowel or bladder issues.  She does have some vaginal dryness.    Review of Systems:    Please see above.  The rest of the review of systems are negative for all systems.    Patient Care Team:  Cata Doll MD as PCP - General     Patient Active Problem List   Diagnosis     Vitamin D Deficiency     Focal Hyperhidrosis     Skin Disorder     Primary Cerebellar Degeneration     Hypercholesterolemia     Generalized Anxiety Disorder     Hypertension     Sleep Apnea     Osteopenia     Arthritis     Difficulty Swallowing (Dysphagia) Pharyngoesophageal Phase     Impaired fasting blood sugar     Cerumen Impaction In The Left Ear      Type 2 diabetes mellitus without complication (H)     No past medical history on file.   Past Surgical History:   Procedure Laterality Date     [place] DILATION/CURETTAGE,DIAGNOSTIC      Description: Dilation And Curettage;  Recorded: 11/13/2009;      Family History   Problem Relation Age of Onset     Goiter Unknown         Multinodular     Diabetes type II Unknown      Ataxia Unknown         Spinal Cerebellar Ataxia      Colon cancer Unknown      Breast cancer Neg Hx       Social History     Socioeconomic History     Marital status: Single     Spouse name: Not on file     Number of children: Not on file  "    Years of education: Not on file     Highest education level: Not on file   Social Needs     Financial resource strain: Not on file     Food insecurity - worry: Not on file     Food insecurity - inability: Not on file     Transportation needs - medical: Not on file     Transportation needs - non-medical: Not on file   Occupational History     Not on file   Tobacco Use     Smoking status: Former Smoker     Last attempt to quit: 1989     Years since quittin.8     Smokeless tobacco: Never Used   Substance and Sexual Activity     Alcohol use: Not on file     Drug use: Not on file     Sexual activity: Not on file   Other Topics Concern     Not on file   Social History Narrative     Not on file      Current Outpatient Medications   Medication Sig Dispense Refill     aspirin 81 mg chewable tablet Chew 81 mg daily.       calcium-vitamin D 500 mg(1,250mg) -200 unit per tablet Take 1 tablet by mouth daily.       cholecalciferol, vitamin D3, (VITAMIN D3) 1,000 unit capsule Take 1,000 Units by mouth daily.       glucosamine-chondroitin 500-400 mg cap Take 1 capsule by mouth once.       omega-3 fatty acids-vitamin E (FISH OIL) 1,000 mg cap 1 capsule daily.        simvastatin (ZOCOR) 40 MG tablet TAKE 1 TABLET BY MOUTH AT BEDTIME 90 tablet 3     triamterene-hydrochlorothiazide (DYAZIDE) 37.5-25 mg per capsule TAKE ONE CAPSULE BY MOUTH EVERY DAY 90 capsule 3     No current facility-administered medications for this visit.       Objective:   Vital Signs:   Visit Vitals  /84 (Patient Site: Left Arm, Patient Position: Sitting, Cuff Size: Adult Large)   Pulse 88   Ht 5' 5.75\" (1.67 m)   Wt 176 lb (79.8 kg)   SpO2 99%   BMI 28.62 kg/m         VisionScreening:  No exam data present     PHYSICAL EXAM  EYES: Eyelids, conjunctiva, and sclera were normal. Pupils were normal. Cornea, iris, and lens were normal bilaterally.  HEAD, EARS, NOSE, MOUTH, AND THROAT: Head and face were normal. Hearing was normal to voice and the " ears were normal to external exam. Nose appearance was normal and there was no discharge. Oropharynx was normal.  TMs were normal.  NECK: Neck appearance was normal. There were no neck masses and the thyroid was not enlarged.  RESPIRATORY: Breathing pattern was normal and the chest moved symmetrically.   Lung sounds were equal bilaterally.  CARDIOVASCULAR: Heart rate and rhythm were normal.  S1 and S2 were normal and there were no extra sounds or murmurs. Peripheral pulses in arms and legs were normal.  Jugular venous pressure was normal.  There was no peripheral edema.  GASTROINTESTINAL: The abdomen was normal in contour.  Bowel sounds were present.   Palpation detected no tenderness, mass, or enlarged organs.   MUSCULOSKELETAL: Skeletal configuration was normal and muscle mass was normal for age. Joint appearance was overall normal.  LYMPHATIC: There were no enlarged nodes.  SKIN/HAIR/NAILS: Skin color was normal.  There were no abnormal skin lesions.  Hair and nails were normal.  NEUROLOGIC: The patient was alert and oriented to person, place, time, and circumstance. Speech was normal. Cranial nerves were normal. Motor strength was normal for age. The patient was normally coordinated.  PSYCHIATRIC:  Mood and affect were normal and the patient had normal recent and remote memory. The patient's judgment and insight were normal.  BREASTS: Examined and within normal limits      Assessment Results 12/5/2018   Activities of Daily Living No help needed   Instrumental Activities of Daily Living No help needed   Get Up and Go Score Less than 12 seconds   Mini Cog Total Score 3   Some recent data might be hidden     A Mini-Cog score of 0-2 suggests the possibility of dementia, score of 3-5 suggests no dementia    Identified Health Risks:     She is at risk for lack of exercise and has been provided with information to increase physical activity for the benefit of her well-being.  The patient reports that she drinks more  than one alcoholic drink per day but denies binge or excessive drinking. She was counseled and given information about possible harmful effects of excessive alcohol intake.  The patient was provided with written information regarding signs of hearing loss.  She is at risk for falling and has been provided with information to reduce the risk of falling at home.  Patient's advanced directive was discussed and I am comfortable with the patient's wishes.

## 2021-06-23 NOTE — TELEPHONE ENCOUNTER
Referral Request  Type of referral: PT  Who s requesting: Patient  Why the request: Gait instability  Have you been seen for this request: Yes     Does patient have a preference on a group/provider? [facility] fax number [phone].  Patient states she has an appointment today and needs the referral faxed asap.    Okay to leave a detailed message?  Yes

## 2021-06-23 NOTE — TELEPHONE ENCOUNTER
Orders being requested: 3D mammogram   Reason service is needed/diagnosis: patient states the last 2 years of mammo she has needed to go back for further testing and is requesting this done the first time.   When are orders needed by: Patient has appointment for 2D scheduled 2/7/19.   Where to send Orders: [facility]  Okay to leave detailed message?  Yes

## 2021-06-24 NOTE — TELEPHONE ENCOUNTER
Who is calling:   Pharmacy  Reason for Call:   This is a new pharmacy for the patient  Date of last appointment with primary care:  12/5/2018  Okay to leave a detailed message: Yes

## 2021-06-24 NOTE — TELEPHONE ENCOUNTER
Refill Approved    Rx renewed per Medication Renewal Policy. Medication was last renewed on 10/31/2018-change in pharmacy.    Anthony Jones, [facility] Triage/Med Refill 3/13/2019     Requested Prescriptions   Pending Prescriptions Disp Refills     simvastatin (ZOCOR) 40 MG tablet 90 tablet 3     Sig: TAKE 1 TABLET BY MOUTH AT BEDTIME    Statins Refill Protocol (Hmg CoA Reductase Inhibitors) Passed - 3/13/2019  2:07 PM       Passed - PCP or prescribing provider visit in past 12 months     Last office visit with prescriber/PCP: 10/31/2018 Cata Doll MD OR same dept: 10/31/2018 Cata Doll MD OR same specialty: 10/31/2018 Cata Doll MD  Last physical: 12/5/2018 Last MTM visit: Visit date not found   Next visit within 3 mo: Visit date not found  Next physical within 3 mo: Visit date not found  Prescriber OR PCP: Cata Doll MD  Last diagnosis associated with med order: 1. Familial hypercholesterolemia  - simvastatin (ZOCOR) 40 MG tablet; TAKE 1 TABLET BY MOUTH AT BEDTIME  Dispense: 90 tablet; Refill: 3    If protocol passes may refill for 12 months if within 3 months of last provider visit (or a total of 15 months).

## 2021-06-25 NOTE — TELEPHONE ENCOUNTER
Refill Request  Did you contact pharmacy: Yes.  Date: 3/13/19  Medication name:   Requested Prescriptions     Pending Prescriptions Disp Refills     triamterene-hydrochlorothiazide (DYAZIDE) 37.5-25 mg per capsule 90 capsule 3     Sig: TAKE ONE CAPSULE BY MOUTH EVERY DAY     Who prescribed the medication: Cata Doll MD   Pharmacy Name and Location: [facility]  Is patient out of medication: No.  7-10 days left  Patient notified refills processed in 72 hours:  no  Okay to leave a detailed message: no    Patient stated this should've been requested with the simvastatin refill request sent on 3/13/19. Patient is now using mail order.

## 2021-06-25 NOTE — TELEPHONE ENCOUNTER
Refill Approved    Rx renewed per Medication Renewal Policy. Medication was last renewed on 10/31/18.    Jasmine Jackson, [facility] Triage/Med Refill 3/18/2019     Requested Prescriptions   Pending Prescriptions Disp Refills     triamterene-hydrochlorothiazide (DYAZIDE) 37.5-25 mg per capsule 90 capsule 3     Sig: TAKE ONE CAPSULE BY MOUTH EVERY DAY    Diuretics/Combination Diuretics Refill Protocol  Passed - 3/18/2019  9:19 AM       Passed - Visit with PCP or prescribing provider visit in past 12 months    Last office visit with prescriber/PCP: 10/31/2018 Cata Doll MD OR same dept: 10/31/2018 Cata Doll MD OR same specialty: 10/31/2018 Cata Doll MD  Last physical: 12/5/2018 Last MTM visit: Visit date not found   Next visit within 3 mo: Visit date not found  Next physical within 3 mo: Visit date not found  Prescriber OR PCP: Cata Doll MD  Last diagnosis associated with med order: 1. Medication management  - triamterene-hydrochlorothiazide (DYAZIDE) 37.5-25 mg per capsule; TAKE ONE CAPSULE BY MOUTH EVERY DAY  Dispense: 90 capsule; Refill: 3    If protocol passes may refill for 12 months if within 3 months of last provider visit (or a total of 15 months).            Passed - Serum Potassium in past 12 months     Lab Results   Component Value Date    Potassium 4.3 10/31/2018            Passed - Serum Sodium in past 12 months     Lab Results   Component Value Date    Sodium 140 10/31/2018            Passed - Blood pressure on file in past 12 months    BP Readings from Last 1 Encounters:   12/05/18 158/84            Passed - Serum Creatinine in past 12 months     Creatinine   Date Value Ref Range Status   10/31/2018 1.10 0.60 - 1.10 mg/dL Final

## 2021-07-04 NOTE — ADDENDUM NOTE
Addendum Note by Marcial Doll MD at 5/5/2021 11:40 AM     Author: Marcial Doll MD Service: -- Author Type: Physician    Filed: 5/6/2021  9:40 AM Encounter Date: 5/5/2021 Status: Signed    : Marcial Doll MD (Physician)    Addended by: MARCIAL DOLL on: 5/6/2021 09:40 AM        Modules accepted: Orders

## 2021-07-04 NOTE — LETTER
Letter by Cata Doll MD at      Author: Cata Doll MD Service: -- Author Type: --    Filed:  Encounter Date: 5/31/2021 Status: (Other)         Rema Clarke  [address] [facility] [address]             May 31, 2021         Dear Ms. Clarke,    Below are the results from your recent visit:    Resulted Orders   DXA Bone Density Scan    Narrative    5/26/2021      RE: Rema Clarke  YOB: 1955        Dear Cata Doll,    Patient Profile:  66 y.o. female, postmenopausal, is here for the follow up bone density   test.   History of fractures - None. Family history of osteoporosis - Yes;    sibling.  Family history of hip fracture: None. Smoking history - No.   Osteoporosis treatment past -  No. Osteoporosis treatment current - No.    Chronic medical problems - None. High risk medications -  None.    Assessment:    1. The spine bone density is assessed using L1-L4 with T-score of 1.1.  2. Femoral bone densities show left femoral neck T- score of 0.0, and   right femoral neck T-score of -0.8..  3.  Bone density was also checked in the wrist as an alternate site since   the spine measurement was influenced by degenerative change.  In the left   33% radius, T score is 1.2.  4.  Since the scan in 2017, bone density has not significantly changed in   either total hip or the AP spine.  5.  The trabecular bone score reflects moderate to poor micro   architecture.    66 y.o. female with NORMAL BONE DENSITY and LOW predicted future fracture   risk.     Recommendations:  Ensure adequate calcium, vitamin D intake, and regular weightbearing   exercise with a recheck in approximately 5 years to monitor for stability.      Bone densitometry was performed on your patient using our Ar[employer]if[employer]o   densitometer. The results are summarized and a copy of the actual scans   are included for your review. In conformity with the International Society   of Clinical Densitometry's most recent  position statement for DXA   interpretation (2015), the diagnosis will be made on the lowest measured   T-score of the lumbar spine, femoral neck, total proximal femur or 33%   radius. Note the change in terminology for diagnostic classification from   OSTEOPENIA to LOW BONE MASS. All trending for sequential exams will be   done using multiple vertebrae or the total proximal femur. Fracture risk   is based on the WHO Fracture Risk Assessment Tool (FRAX). If additional   information is needed or if you would like to discuss the results, please   do not hesitate to call me.       Thank you for referring this patient to [facility]. We are happy to be of service in support of you and your   practice. If you have any questions or suggestions to improve our service,   please call me at [phone].     Sincerely,     Graeme Jefferson M.D. C.C.D.  [facility]       This is a normal study!!  Recheck in 3-5 years!  Please call with questions or contact us using [employer].    Sincerely,        Electronically signed by Cata Doll MD

## 2021-07-06 ENCOUNTER — RECORDS - HEALTHEAST (OUTPATIENT)
Dept: ADMINISTRATIVE | Facility: OTHER | Age: 66
End: 2021-07-06

## 2021-08-06 NOTE — PATIENT INSTRUCTIONS - HE
Patient Instructions by Cata Doll MD at 5/5/2021 11:40 AM     Author: Cata Doll MD Service: -- Author Type: Physician    Filed: 5/5/2021 12:19 PM Encounter Date: 5/5/2021 Status: Signed    : Cata Doll MD (Physician)         Patient Education     Exercise for a Healthier Heart  You may wonder how you can improve the health of your heart. If youre thinking about exercise, youre on the right track. You dont need to become an athlete, but you do need a certain amount of brisk exercise to help strengthen your heart. If you have been diagnosed with a heart condition, your doctor may recommend exercise to help stabilize your condition. To help make exercise a habit, choose safe, fun activities.       Be sure to check with your health care provider before starting an exercise program.    Why exercise?  Exercising regularly offers many healthy rewards. It can help you do all of the following:    Improve your blood cholesterol levels to help prevent further heart trouble    Lower your blood pressure to help prevent a stroke or heart attack    Control diabetes, or reduce your risk of getting this disease    Improve your heart and lung function    Reach and maintain a healthy weight    Make your muscles stronger and more limber so you can stay active    Prevent falls and fractures by slowing the loss of bone mass (osteoporosis)    Manage stress better  Exercise tips  Ease into your routine. Set small goals. Then build on them.  Exercise on most days. Aim for a total of 150 or more minutes of moderate to  vigorous intensity activity each week. Consider 40 minutes, 3 to 4 times a week. For best results, activity should last for 40 minutes on average. It is OK to work up to the 40 minute period over time. Examples of moderate-intensity activity is walking one mile in 15 minutes or 30 to 45 minutes of yard work.  Step up your daily activity level. Along with your exercise program, try being more  active throughout the day. Walk instead of drive. Do more household tasks or yard work.  Choose one or more activities you enjoy. Walking is one of the easiest things you can do. You can also try swimming, riding a bike, or taking an exercise class.  Stop exercising and call your doctor if you:    Have chest pain or feel dizzy or lightheaded    Feel burning, tightness, pressure, or heaviness in your chest, neck, shoulders, back, or arms    Have unusual shortness of breath    Have increased joint or muscle pain    Have palpitations or an irregular heartbeat      8534-4223 #waywire. [address] [place] [address]. All rights reserved. This information is not intended as a substitute for professional medical care. Always follow your healthcare professional's instructions.         Patient Education   Understanding Lydia MyPlate  The USDA (US Department of Agriculture) has guidelines to help you make healthy food choices. These are called MyPlate. MyPlate shows the food groups that make up healthy meals using the image of a place setting. Before you eat, think about the healthiest choices for what to put onto your plate or into your cup or bowl. To learn more about building a healthy plate, visit www.choosemyplate.gov.       The Food Groups    Fruits: Any fruit or 100% fruit juice counts as part of the Fruit Group. Fruits may be fresh, canned, frozen, or dried, and may be whole, cut-up, or pureed. Make half your plate fruits and vegetables.    Vegetables: Any vegetable or 100% vegetable juice counts as a member of the Vegetable Group. Vegetables may be fresh, frozen, canned, or dried. They can be served raw or cooked and may be whole, cut-up, or mashed. Make half your plate fruits and vegetables.     Grains: All foods made from grains are part of the Grains Group. These include wheat, rice, oats, cornmeal, and barley such as bread, pasta, oatmeal, cereal, tortillas, and grits. Grains should be  no more than a quarter of your plate. At least half of your grains should be whole grains.    Protein: This group includes meat, poultry, seafood, beans and peas, eggs, processed soy products (like tofu), nuts (including nut butters), and seeds. Make protein choices no more than a quarter of your plate. Meat and poultry choices should be lean or low fat.    Dairy: All fluid milk products and foods made from milk that contain calcium, like yogurt and cheese are part of the Dairy Group. (Foods that have little calcium, such as cream, butter, and cream cheese, are not part of the group.) Most dairy choices should be low-fat or fat-free.    Oils: These are fats that are liquid at room temperature. They include canola, corn, olive, soybean, and sunflower oil. Foods that are mainly oil include mayonnaise, certain salad dressings, and soft margarines. You should have only 5 to 7 teaspoons of oils a day. You probably already get this much from the food you eat.  Use [employer] to Help Build Your Meals  The Alg[employer] can help you plan and track your meals and activity. You can look up individual foods to see or compare their nutritional value. You can get guidelines for what and how much you should eat. You can compare your food choices. And you can assess personal physical activities and see ways you can improve. Go to www."[employer]".gov/supertracker/.    6740-6447 The [employer]. [address] [place] [address]. All rights reserved. This information is not intended as a substitute for professional medical care. Always follow your healthcare professional's instructions.           Patient Education   Your Health Risk Assessment indicates you feel you are not in good emotional health.    Recreation   Recreation is not limited to sports and team events. It includes any activity that provides relaxation, interest, enjoyment, and exercise. Recreation provides an outlet for physical, mental, and  social energy. It can give a sense of worth and achievement. It can help you stay healthy.    Mental Exercise and Social Involvement  Mental and emotional health is as important as physical health. Keep in touch with friends and family. Stay as active as possible. Continue to learn and challenge yourself.   Things you can do to stay mentally active are:    Learn something new, like a foreign language or musical instrument.     Play SCRABBLE or do crossword puzzles. If you cannot find people to play these games with you at home, you can play them with others on your computer through the Internet.     Join a games club--anything from card games to chess or checkers or lawn bowling.     Start a new hobby.     Go back to school.     Volunteer.     Read.     Keep up with world events.       Patient Education   Preventing Falls in the Home  As you get older, falls are more likely. Thats because your reaction time slows. Your muscles and joints may also get stiffer, making them less flexible. Illness, medications, and vision changes can also affect your balance. A fall could leave you unable to live on your own. To make your home safer, follow these tips:    Floors    Put nonskid pads under area rugs.    Remove throw rugs.    Replace worn floor coverings.    Tack carpets firmly to each step on carpeted stairs. Put nonskid strips on the edges of uncarpeted stairs.    Keep floors and stairs free of clutter and cords.    Arrange furniture so there are clear pathways.    Clean up any spills right away.    Bathrooms    Install grab bars in the tub or shower.    Apply nonskid strips or put a nonskid rubber mat in the tub or shower.    Sit on a bath chair to bathe.    Use bathmats with nonskid backing.    Lighting    Keep a flashlight in each room.    Put a nightlight along the pathway between the bedroom and the bathroom.    0078-6831 The [employer]. [address] [place] [address], [place] [address]. All rights reserved. This  information is not intended as a substitute for professional medical care. Always follow your healthcare professional's instructions.           Advance Directive  Patients advance directive was discussed and I am comfortable with the patients wishes.  Patient Education   Personalized Prevention Plan  You are due for the preventive services outlined below.  Your care team is available to assist you in scheduling these services.  If you have already completed any of these items, please share that information with your care team to update in your medical record.  Health Maintenance Due   Topic Date Due   ? HEPATITIS C SCREENING  Never done   ? LIPID  10/31/2019   ? DIABETIC EYE EXAM  04/24/2020   ? MICROALBUMIN  06/05/2020   ? A1C  07/28/2020   ? BMP  01/28/2021   ? DIABETIC FOOT EXAM  01/28/2021

## 2021-09-08 DIAGNOSIS — I10 ESSENTIAL HYPERTENSION: Primary | ICD-10-CM

## 2021-09-08 RX ORDER — TRIAMTERENE/HYDROCHLOROTHIAZID 37.5-25 MG
1 TABLET ORAL DAILY
Qty: 90 TABLET | Refills: 3 | Status: SHIPPED | OUTPATIENT
Start: 2021-09-08 | End: 2022-09-16

## 2021-09-08 NOTE — TELEPHONE ENCOUNTER
Rema is call regarding medication and is requesting a refill and PCP is APOLINAR Sorto MD.  Medication is Triamterine that Lucas is requesting.  Rema is  requesting prescription be sent to [facility] on [address] in [facility].   Patient states that MD Doll is her PCP.  Patient states that she changed insurance companies.  Please phone Rema when the prescription has be sent.  No record of patient taking medication on her med list and she states that she has been taking this medication for years.    COVID 19 Nurse Triage Plan/Patient Instructions    Please be aware that novel coronavirus (COVID-19) may be circulating in the community. If you develop symptoms such as fever, cough, or SOB or if you have concerns about the presence of another infection including coronavirus (COVID-19), please contact your health care provider or visit [email or web address].     Disposition/Instructions    Home care recommended. Follow home care protocol based instructions.    Thank you for taking steps to prevent the spread of this virus.  o Limit your contact with others.  o Wear a simple mask to cover your cough.  o Wash your hands well and often.    Resources    [facility]: About COVID-19: [email or web address]/    CDC: What to Do If You're Sick: www.cdc.gov/coronavirus/2019-ncov/about/steps-when-sick.html    CDC: Ending Home Isolation: www.cdc.gov/coronavirus/2019-ncov/hcp/disposition-in-home-patients.html     CDC: Caring for Someone: www.cdc.gov/coronavirus/2019-ncov/if-you-are-sick/care-for-someone.html     [facility]: Interim Guidance for Hospital Discharge to Home: www.health.[facility].mn.us/diseases/coronavirus/hcp/hospdischarge.pdf    [facility] clinical trials (COVID-19 research studies): clinicalaffairs.[employer].[facility]/umn-clinical-trials     Below are the COVID-19 hotlines at the [place] Department of Health ([facility]). Interpreters are available.   o For health questions: Call 312-921-5357 or  1-338.392.4408 (7 a.m. to 7 p.m.)  o For questions about schools and childcare: Call 770-945-7194 or 1-602.281.9348 (7 a.m. to 7 p.m.)

## 2021-11-02 DIAGNOSIS — E78.00 HYPERCHOLESTEROLEMIA: Primary | ICD-10-CM

## 2021-11-02 NOTE — TELEPHONE ENCOUNTER
Reason for Call:  Medication or medication refill:    Do you use a [facility]?  Name of the pharmacy and phone number for the current request:  [facility] [address]    Name of the medication requested:    simvastatin (ZOCOR) 40 MG tablet TAKE 1 TABLET AT BEDTIME 90 tablet 0         Other request: pt would like a 90 days sent in and then she will check with insurance on mail order    Can we leave a detailed message on this number? YES    Phone number patient can be reached at: Cell number on file:    Telephone Information:   Mobile [phone]       Best Time: any    Call taken on 11/2/2021 at 10:35 AM by Pam J. Behr

## 2021-11-03 RX ORDER — SIMVASTATIN 40 MG
40 TABLET ORAL AT BEDTIME
Qty: 90 TABLET | Refills: 3 | Status: SHIPPED | OUTPATIENT
Start: 2021-11-03 | End: 2022-02-25

## 2021-11-03 NOTE — TELEPHONE ENCOUNTER
"  Disp Refills Start End ROSA    simvastatin (ZOCOR) 40 MG tablet 90 tablet 0 11/30/2020  No   Sig: TAKE 1 TABLET AT BEDTIME   Sent to pharmacy as: simvastatin 40 mg tablet (ZOCOR)   E-Prescribing Status: Receipt confirmed by pharmacy (11/30/2020  6:08 AM CST)       simvastatin (ZOCOR) 40 MG tablet [[id number]]    Electronically signed by: Natty Vincent RN on 11/30/20 0607 Status: Active   Ordering user: Natty Vincent RN 11/30/20 0607 Ordering provider: Cata Doll MD   Authorized by: Cata Doll MD   Frequency:  11/30/20 - Until Discontinued Released by: Natty Vincent RN 11/30/20 0607   Diagnoses  Familial hypercholesterolemia [E78.01]     Routing refill request to provider for review/approval because:  A break in medication    Last office visit provider:  5/5/21     Requested Prescriptions   Pending Prescriptions Disp Refills     simvastatin (ZOCOR) 40 MG tablet 90 tablet 3     Sig: Take 1 tablet (40 mg) by mouth At Bedtime       Statins Protocol Passed - 11/3/2021  8:28 AM        Passed - LDL on file in past 12 months     Recent Labs   Lab Test 05/05/21  1234                Passed - No abnormal creatine kinase in past 12 months     No lab results found.             Passed - Recent (12 mo) or future (30 days) visit within the authorizing provider's specialty     Patient has had an office visit with the authorizing provider or a provider within the authorizing providers department within the previous 12 mos or has a future within next 30 days. See \"Patient Info\" tab in inbasket, or \"Choose Columns\" in Meds & Orders section of the refill encounter.              Passed - Medication is active on med list        Passed - Patient is age 18 or older        Passed - No active pregnancy on record        Passed - No positive pregnancy test in past 12 months             Pablito Mccurdy RN 11/03/21 3:19 PM  "

## 2021-11-03 NOTE — TELEPHONE ENCOUNTER
Pending Prescriptions:                       Disp   Refills    simvastatin (ZOCOR) 40 MG tablet          90 tab*3            Sig: Take 1 tablet (40 mg) by mouth At Bedtime

## 2022-02-25 ENCOUNTER — OFFICE VISIT (OUTPATIENT)
Dept: INTERNAL MEDICINE | Facility: CLINIC | Age: 67
End: 2022-02-25
Payer: COMMERCIAL

## 2022-02-25 VITALS
HEART RATE: 72 BPM | DIASTOLIC BLOOD PRESSURE: 74 MMHG | BODY MASS INDEX: 29.29 KG/M2 | OXYGEN SATURATION: 100 % | SYSTOLIC BLOOD PRESSURE: 146 MMHG | WEIGHT: 184.2 LBS

## 2022-02-25 DIAGNOSIS — M79.651 PAIN IN BOTH THIGHS: Primary | ICD-10-CM

## 2022-02-25 DIAGNOSIS — E55.9 VITAMIN D DEFICIENCY: ICD-10-CM

## 2022-02-25 DIAGNOSIS — I10 ESSENTIAL HYPERTENSION, BENIGN: ICD-10-CM

## 2022-02-25 DIAGNOSIS — G11.9 PRIMARY CEREBELLAR DEGENERATION (H): ICD-10-CM

## 2022-02-25 DIAGNOSIS — Z00.00 ENCOUNTER FOR PREVENTIVE CARE: ICD-10-CM

## 2022-02-25 DIAGNOSIS — M79.652 PAIN IN BOTH THIGHS: Primary | ICD-10-CM

## 2022-02-25 DIAGNOSIS — E78.01 FAMILIAL HYPERCHOLESTEROLEMIA: ICD-10-CM

## 2022-02-25 DIAGNOSIS — N18.31 STAGE 3A CHRONIC KIDNEY DISEASE (H): ICD-10-CM

## 2022-02-25 DIAGNOSIS — E11.9 TYPE 2 DIABETES MELLITUS WITHOUT COMPLICATION, WITHOUT LONG-TERM CURRENT USE OF INSULIN (H): ICD-10-CM

## 2022-02-25 LAB
ALBUMIN SERPL-MCNC: 3.9 G/DL (ref 3.5–5)
ALP SERPL-CCNC: 42 U/L (ref 45–120)
ALT SERPL W P-5'-P-CCNC: 26 U/L (ref 0–45)
ANION GAP SERPL CALCULATED.3IONS-SCNC: 10 MMOL/L (ref 5–18)
AST SERPL W P-5'-P-CCNC: 21 U/L (ref 0–40)
BASOPHILS # BLD AUTO: 0 10E3/UL (ref 0–0.2)
BASOPHILS NFR BLD AUTO: 1 %
BILIRUB SERPL-MCNC: 0.6 MG/DL (ref 0–1)
BUN SERPL-MCNC: 9 MG/DL (ref 8–22)
CALCIUM SERPL-MCNC: 10 MG/DL (ref 8.5–10.5)
CHLORIDE BLD-SCNC: 106 MMOL/L (ref 98–107)
CHOLEST SERPL-MCNC: 199 MG/DL
CK SERPL-CCNC: 178 U/L (ref 30–190)
CO2 SERPL-SCNC: 25 MMOL/L (ref 22–31)
CREAT SERPL-MCNC: 0.86 MG/DL (ref 0.6–1.1)
EOSINOPHIL # BLD AUTO: 0.1 10E3/UL (ref 0–0.7)
EOSINOPHIL NFR BLD AUTO: 2 %
ERYTHROCYTE [DISTWIDTH] IN BLOOD BY AUTOMATED COUNT: 14.5 % (ref 10–15)
FASTING STATUS PATIENT QL REPORTED: NO
GFR SERPL CREATININE-BSD FRML MDRD: 74 ML/MIN/1.73M2
GLUCOSE BLD-MCNC: 98 MG/DL (ref 70–125)
HBA1C MFR BLD: 6.3 % (ref 0–5.6)
HCT VFR BLD AUTO: 44.7 % (ref 35–47)
HDLC SERPL-MCNC: 60 MG/DL
HGB BLD-MCNC: 14.2 G/DL (ref 11.7–15.7)
IMM GRANULOCYTES # BLD: 0 10E3/UL
IMM GRANULOCYTES NFR BLD: 1 %
LDLC SERPL CALC-MCNC: 99 MG/DL
LYMPHOCYTES # BLD AUTO: 1.9 10E3/UL (ref 0.8–5.3)
LYMPHOCYTES NFR BLD AUTO: 47 %
MCH RBC QN AUTO: 29.7 PG (ref 26.5–33)
MCHC RBC AUTO-ENTMCNC: 31.8 G/DL (ref 31.5–36.5)
MCV RBC AUTO: 94 FL (ref 78–100)
MONOCYTES # BLD AUTO: 0.3 10E3/UL (ref 0–1.3)
MONOCYTES NFR BLD AUTO: 7 %
NEUTROPHILS # BLD AUTO: 1.8 10E3/UL (ref 1.6–8.3)
NEUTROPHILS NFR BLD AUTO: 44 %
PLATELET # BLD AUTO: 199 10E3/UL (ref 150–450)
POTASSIUM BLD-SCNC: 3.6 MMOL/L (ref 3.5–5)
PROT SERPL-MCNC: 7.2 G/DL (ref 6–8)
RBC # BLD AUTO: 4.78 10E6/UL (ref 3.8–5.2)
SODIUM SERPL-SCNC: 141 MMOL/L (ref 136–145)
TRIGL SERPL-MCNC: 201 MG/DL
TSH SERPL DL<=0.005 MIU/L-ACNC: 0.84 UIU/ML (ref 0.3–5)
WBC # BLD AUTO: 4.2 10E3/UL (ref 4–11)

## 2022-02-25 PROCEDURE — 36415 COLL VENOUS BLD VENIPUNCTURE: CPT | Performed by: INTERNAL MEDICINE

## 2022-02-25 PROCEDURE — 83036 HEMOGLOBIN GLYCOSYLATED A1C: CPT | Performed by: INTERNAL MEDICINE

## 2022-02-25 PROCEDURE — 99214 OFFICE O/P EST MOD 30 MIN: CPT | Performed by: INTERNAL MEDICINE

## 2022-02-25 PROCEDURE — 80053 COMPREHEN METABOLIC PANEL: CPT | Performed by: INTERNAL MEDICINE

## 2022-02-25 PROCEDURE — 80061 LIPID PANEL: CPT | Performed by: INTERNAL MEDICINE

## 2022-02-25 PROCEDURE — 82550 ASSAY OF CK (CPK): CPT | Performed by: INTERNAL MEDICINE

## 2022-02-25 PROCEDURE — 84443 ASSAY THYROID STIM HORMONE: CPT | Performed by: INTERNAL MEDICINE

## 2022-02-25 PROCEDURE — 82306 VITAMIN D 25 HYDROXY: CPT | Performed by: INTERNAL MEDICINE

## 2022-02-25 PROCEDURE — 85025 COMPLETE CBC W/AUTO DIFF WBC: CPT | Performed by: INTERNAL MEDICINE

## 2022-02-25 RX ORDER — LOSARTAN POTASSIUM 25 MG/1
25 TABLET ORAL DAILY
Qty: 30 TABLET | Refills: 1 | Status: SHIPPED | OUTPATIENT
Start: 2022-02-25 | End: 2022-04-30

## 2022-02-25 NOTE — PROGRESS NOTES
[facility] Follow Up Note-follow-up of usual medical problems    Assessment/Plan:    1. Pain in both thighs  Recent history of bilateral thigh pain and occasional weak legs.  Underlying history of primary cerebellar degeneration.  She feels the symptoms-however symptoms seem different.  Consideration for statin myopathy-we will check CK along with labs as below.  Recommendations: Hold statin for the next 4 weeks.  Begin enzyme co-Q10.  Will monitor if leg weakness improves.  - CK total; Future  - CK total    2. Primary cerebellar degeneration (H)  Encourage follow-up with neurology.  Of note, she has had 2 falls since last visit.  Encourage cane.  She has completed physical therapy    3. Type 2 diabetes mellitus without complication, without long-term current use of insulin (H)  We will update labs.  Weight up about 7 pounds.  Diet trial diabetes.  We will update labs  - CBC with Platelets & Differential; Future  - Comprehensive metabolic panel; Future  - Hemoglobin A1c; Future  - TSH with free T4 reflex; Future  - CBC with Platelets & Differential  - Comprehensive metabolic panel  - Hemoglobin A1c  - TSH with free T4 reflex    4. Stage 3a chronic kidney disease (H)  We will update labs.  In the setting of elevated blood pressure and type 2 diabetes-we will add low-dose ARB monitor  - CBC with Platelets & Differential; Future  - Comprehensive metabolic panel; Future  - TSH with free T4 reflex; Future  - CBC with Platelets & Differential  - Comprehensive metabolic panel  - TSH with free T4 reflex    5. Familial hypercholesterolemia  With question of statin myopathy.  She has been on simvastatin for a long period of time.  We will hold simvastatin for 4 weeks.  Encourage initiation of co-Q10.  Will reevaluate at her follow-up visit 4 weeks  - Comprehensive metabolic panel; Future  - Lipid panel reflex to direct LDL Fasting; Future  - TSH with free T4 reflex; Future  - Comprehensive metabolic panel  -  Lipid panel reflex to direct LDL Fasting  - TSH with free T4 reflex    6. Vitamin D deficiency  We will update labs  - Vitamin D Deficiency; Future  - Vitamin D Deficiency    7. Essential hypertension, benign  Elevated blood pressure x2.  This in the setting of chronic kidney disease and diabetes-we will add low-dose losartan at 25 mg-monitoring electrolytes next month  - losartan (COZAAR) 25 MG tablet; Take 1 tablet (25 mg) by mouth daily  Dispense: 30 tablet; Refill: 1    8. Encounter for preventive care    Up-to-date on mammography/ophthalmologic exam/bone density and colon cancer screening    - CBC with Platelets & Differential; Future  - Comprehensive metabolic panel; Future  - Lipid panel reflex to direct LDL Fasting; Future  - CBC with Platelets & Differential  - Comprehensive metabolic panel  - Lipid panel reflex to direct LDL Fasting          Cata Doll MD, MD    Chief Complaint:  Chief Complaint   Patient presents with     Follow Up     Medication Update       History of Present Illness:  Rema is a 66 year old female who is here today for follow-up of her usual medical problems.  She has diet-controlled diabetes, mild hypertension, a primary cerebellar degenerative syndrome-familial.  Today, she states that overall she is doing well.  She has a history of Covid 19 infection in the past.  She states it occurred around [place] time and she was completely asymptomatic.    She does recently report some muscle weakness in the proximal leg muscles.  She describes a tightness in the thighs.  She states that on occasion, she feels as though her legs give out and are achy.  She is wondering if this could be from her statin.  She does not feel that it is a result of her cerebellar degenerative disorder.  She states her sister symptoms do not include muscle achiness and weakness.  She has not yet been in to see her neurologist.    Because of her recurrent uveitis/iritis, she was referred to rheumatology.   They could not identify any additional rheumatologic illness at this time.    Review of Systems:  A comprehensive review of systems was performed.  She is noting hair thinning.  Remainder of the review was performed and was otherwise negative    PFSH:  Social History: She is single.  She has strong family and friend at work  History   Smoking Status     Former Smoker     Quit date: 2/23/1989   Smokeless Tobacco     Never Used       Past History:   Past Medical History:   Diagnosis Date     Balance problem      Hypertension        Current Outpatient Medications   Medication Sig Dispense Refill     calcium carbonate-vitamin D (OYSTER SHELL CALCIUM/D) 500-200 MG-UNIT tablet Take 1 tablet by mouth daily       cholecalciferol (VITAMIN D3) 25 mcg (1000 units) capsule Take 1,000 Units by mouth       losartan (COZAAR) 25 MG tablet Take 1 tablet (25 mg) by mouth daily 30 tablet 1     triamterene-HCTZ (MAXZIDE-25) 37.5-25 MG tablet Take 1 tablet by mouth daily 90 tablet 3       Family History:     Physical Exam:  General Appearance:   She appears quite well and in no acute distress  Vitals:    02/25/22 1138 02/25/22 1140   BP: (!) 164/82 (!) 146/74   BP Location: Left arm Left arm   Patient Position: Sitting Sitting   Cuff Size: Adult Regular Adult Large   Pulse: 72    SpO2: 100%    Weight: 83.6 kg (184 lb 3.2 oz)      Wt Readings from Last 3 Encounters:   02/25/22 83.6 kg (184 lb 3.2 oz)   05/05/21 83.3 kg (183 lb 9.6 oz)   01/28/20 80.2 kg (176 lb 12.8 oz)     Body mass index is 29.29 kg/m .    She is pleasant, well-appearing and in no acute distress.  Her repeat blood pressure-164/80    Data Review:    Analysis and Summary of Old Records (2): Reviewed rheumatology notes    Records Requested (1):       Other History Summarized (from other people in the room) (2):     Radiology Tests Summarized (XRAY/CT/MRI/DXA) (1): Reviewed bone density    Labs Reviewed (1): Ordered labs    Medicine Tests Reviewed  (EKG/ECHO/COLONOSCOPY/EGD) (1): Reviewed colonoscopy when evaluating preventative care issues    Independent Review of EKG or X-RAY (2):

## 2022-02-25 NOTE — LETTER
March 1, 2022      Rema Clarke  [address] [facility] [id number]        Dear ,    We are writing to inform you of your test results.    Rema, per usual, it was a pleasure to see you in the office!. Enclosed are your labs. I did a CK which is a muscle enzyme. It is within normal limits, as are your liver enzymes. This is very reassuring. I am hoping that the break from your statin medication resulted in improvement of your muscle weakness symptoms. If not, it may be helpful to touch back in with the neurologist.    Your diabetes is stable, your cholesterol is at goal and your kidney function looks great.    We will see you for follow-up soon!    Resulted Orders   Comprehensive metabolic panel   Result Value Ref Range    Sodium 141 136 - 145 mmol/L    Potassium 3.6 3.5 - 5.0 mmol/L    Chloride 106 98 - 107 mmol/L    Carbon Dioxide (CO2) 25 22 - 31 mmol/L    Anion Gap 10 5 - 18 mmol/L    Urea Nitrogen 9 8 - 22 mg/dL    Creatinine 0.86 0.60 - 1.10 mg/dL    Calcium 10.0 8.5 - 10.5 mg/dL    Glucose 98 70 - 125 mg/dL    Alkaline Phosphatase 42 (L) 45 - 120 U/L    AST 21 0 - 40 U/L    ALT 26 0 - 45 U/L    Protein Total 7.2 6.0 - 8.0 g/dL    Albumin 3.9 3.5 - 5.0 g/dL    Bilirubin Total 0.6 0.0 - 1.0 mg/dL    GFR Estimate 74 >60 mL/min/1.73m2      Comment:      Effective December 21, 2021 eGFRcr in adults is calculated using the 2021 CKD-EPI creatinine equation which includes age and gender (Claude et al., NEJ, DOI: 10.1056/HDVBwf3374958)   Hemoglobin A1c   Result Value Ref Range    Hemoglobin A1C 6.3 (H) 0.0 - 5.6 %      Comment:      Normal <5.7%   Prediabetes 5.7-6.4%    Diabetes 6.5% or higher     Note: Adopted from ADA consensus guidelines.   Lipid panel reflex to direct LDL Fasting   Result Value Ref Range    Cholesterol 199 <=199 mg/dL    Triglycerides 201 (H) <=149 mg/dL    Direct Measure HDL 60 >=50 mg/dL      Comment:      HDL Cholesterol Reference Range:     0-2 years:   No reference  ranges established for patients under 2 years old  at [facility] for lipid analytes.    2-8 years:  Greater than 45 mg/dL     18 years and older:   Female: Greater than or equal to 50 mg/dL   Male:   Greater than or equal to 40 mg/dL    LDL Cholesterol Calculated 99 <=129 mg/dL    Patient Fasting > 8hrs? No    TSH with free T4 reflex   Result Value Ref Range    TSH 0.84 0.30 - 5.00 uIU/mL   Vitamin D Deficiency   Result Value Ref Range    Vitamin D, Total (25-Hydroxy) 44 30 - 80 ug/L    Narrative    Deficiency <10.0 ug/L  Insufficiency 10.0-29.9 ug/L  Sufficiency 30.0-80.0 ug/L  Toxicity (possible) >100.0 ug/L    CK total   Result Value Ref Range     30 - 190 U/L   CBC with platelets and differential   Result Value Ref Range    WBC Count 4.2 4.0 - 11.0 10e3/uL    RBC Count 4.78 3.80 - 5.20 10e6/uL    Hemoglobin 14.2 11.7 - 15.7 g/dL    Hematocrit 44.7 35.0 - 47.0 %    MCV 94 78 - 100 fL    MCH 29.7 26.5 - 33.0 pg    MCHC 31.8 31.5 - 36.5 g/dL    RDW 14.5 10.0 - 15.0 %    Platelet Count 199 150 - 450 10e3/uL    % Neutrophils 44 %    % Lymphocytes 47 %    % Monocytes 7 %    % Eosinophils 2 %    % Basophils 1 %    % Immature Granulocytes 1 %    Absolute Neutrophils 1.8 1.6 - 8.3 10e3/uL    Absolute Lymphocytes 1.9 0.8 - 5.3 10e3/uL    Absolute Monocytes 0.3 0.0 - 1.3 10e3/uL    Absolute Eosinophils 0.1 0.0 - 0.7 10e3/uL    Absolute Basophils 0.0 0.0 - 0.2 10e3/uL    Absolute Immature Granulocytes 0.0 <=0.4 10e3/uL       If you have any questions or concerns, please call the clinic at the number listed above.       Sincerely,      Cata Doll MD

## 2022-02-28 LAB — DEPRECATED CALCIDIOL+CALCIFEROL SERPL-MC: 44 UG/L (ref 30–80)

## 2022-03-20 DIAGNOSIS — I10 ESSENTIAL HYPERTENSION, BENIGN: ICD-10-CM

## 2022-03-21 RX ORDER — LOSARTAN POTASSIUM 25 MG/1
TABLET ORAL
Qty: 30 TABLET | Refills: 1 | OUTPATIENT
Start: 2022-03-21

## 2022-03-21 NOTE — TELEPHONE ENCOUNTER
"Should still have 1 refill on file.    Last Written Prescription Date:  02/25/2022  Last Fill Quantity: 30,  # refills: 1   Last office visit provider:  02/25/2022     Requested Prescriptions   Pending Prescriptions Disp Refills     losartan (COZAAR) 25 MG tablet [Pharmacy Med Name: LOSARTAN POTASSIUM 25 MG TAB] 30 tablet 1     Sig: TAKE 1 TABLET BY MOUTH EVERY DAY       Angiotensin-II Receptors Failed - 3/20/2022  9:32 AM        Failed - Last blood pressure under 140/90 in past 12 months     BP Readings from Last 3 Encounters:   02/25/22 (!) 146/74   05/05/21 130/70   01/28/20 132/74                 Passed - Recent (12 mo) or future (30 days) visit within the authorizing provider's specialty     Patient has had an office visit with the authorizing provider or a provider within the authorizing providers department within the previous 12 mos or has a future within next 30 days. See \"Patient Info\" tab in inbasket, or \"Choose Columns\" in Meds & Orders section of the refill encounter.              Passed - Medication is active on med list        Passed - Patient is age 18 or older        Passed - No active pregnancy on record        Passed - Normal serum creatinine on file in past 12 months     Recent Labs   Lab Test 02/25/22  1222   CR 0.86       Ok to refill medication if creatinine is low          Passed - Normal serum potassium on file in past 12 months     Recent Labs   Lab Test 02/25/22  1222   POTASSIUM 3.6                    Passed - No positive pregnancy test in past 12 months             Ashley Alfaro 03/21/22 5:38 PM  "

## 2022-04-13 ENCOUNTER — OFFICE VISIT (OUTPATIENT)
Dept: INTERNAL MEDICINE | Facility: CLINIC | Age: 67
End: 2022-04-13
Payer: COMMERCIAL

## 2022-04-13 VITALS
BODY MASS INDEX: 28.73 KG/M2 | WEIGHT: 180.7 LBS | HEART RATE: 80 BPM | OXYGEN SATURATION: 97 % | SYSTOLIC BLOOD PRESSURE: 138 MMHG | DIASTOLIC BLOOD PRESSURE: 62 MMHG

## 2022-04-13 DIAGNOSIS — Z23 HIGH PRIORITY FOR 2019-NCOV VACCINE: ICD-10-CM

## 2022-04-13 DIAGNOSIS — I10 ESSENTIAL HYPERTENSION: ICD-10-CM

## 2022-04-13 DIAGNOSIS — E78.00 HYPERCHOLESTEROLEMIA: ICD-10-CM

## 2022-04-13 DIAGNOSIS — E11.9 TYPE 2 DIABETES MELLITUS WITHOUT COMPLICATION, WITHOUT LONG-TERM CURRENT USE OF INSULIN (H): ICD-10-CM

## 2022-04-13 DIAGNOSIS — R29.898 LEG WEAKNESS, BILATERAL: Primary | ICD-10-CM

## 2022-04-13 LAB
ANION GAP SERPL CALCULATED.3IONS-SCNC: 11 MMOL/L (ref 5–18)
BUN SERPL-MCNC: 11 MG/DL (ref 8–22)
CALCIUM SERPL-MCNC: 10.7 MG/DL (ref 8.5–10.5)
CHLORIDE BLD-SCNC: 105 MMOL/L (ref 98–107)
CO2 SERPL-SCNC: 24 MMOL/L (ref 22–31)
CREAT SERPL-MCNC: 1.05 MG/DL (ref 0.6–1.1)
GFR SERPL CREATININE-BSD FRML MDRD: 58 ML/MIN/1.73M2
GLUCOSE BLD-MCNC: 115 MG/DL (ref 70–125)
POTASSIUM BLD-SCNC: 4.1 MMOL/L (ref 3.5–5)
SODIUM SERPL-SCNC: 140 MMOL/L (ref 136–145)

## 2022-04-13 PROCEDURE — 91306 COVID-19,PF,MODERNA (18+ YRS BOOSTER .25ML): CPT | Performed by: INTERNAL MEDICINE

## 2022-04-13 PROCEDURE — 80048 BASIC METABOLIC PNL TOTAL CA: CPT | Performed by: INTERNAL MEDICINE

## 2022-04-13 PROCEDURE — 99214 OFFICE O/P EST MOD 30 MIN: CPT | Performed by: INTERNAL MEDICINE

## 2022-04-13 PROCEDURE — 36415 COLL VENOUS BLD VENIPUNCTURE: CPT | Performed by: INTERNAL MEDICINE

## 2022-04-13 PROCEDURE — 82043 UR ALBUMIN QUANTITATIVE: CPT | Performed by: INTERNAL MEDICINE

## 2022-04-13 PROCEDURE — 0064A COVID-19,PF,MODERNA (18+ YRS BOOSTER .25ML): CPT | Performed by: INTERNAL MEDICINE

## 2022-04-13 RX ORDER — EZETIMIBE 10 MG/1
10 TABLET ORAL DAILY
Qty: 90 TABLET | Refills: 3 | Status: SHIPPED | OUTPATIENT
Start: 2022-04-13 | End: 2022-04-29 | Stop reason: SINTOL

## 2022-04-13 NOTE — PROGRESS NOTES
[facility] Follow Up Note    Assessment/Plan:  1. Leg weakness, bilateral  Follow-up 2 wellness visit where she complains of achiness and leg weakness.  Statin was placed on hold.  Co-Q10 was initiated.  Symptoms have resolved.  No falls since last visit.  Recommendation: Will remain off statin for now-especially in the setting of cerebellar degeneration-leg strength crucial    2. Hypercholesterolemia  In the setting of type 2 diabetes  -Statin intolerant-we will begin Zetia  - ezetimibe (ZETIA) 10 MG tablet; Take 1 tablet (10 mg) by mouth daily  Dispense: 90 tablet; Refill: 3    3. Type 2 diabetes mellitus without complication, without long-term current use of insulin (H)  Stable-glycohemoglobin 6.3  - Basic metabolic panel  (Ca, Cl, CO2, Creat, Gluc, K, Na, BUN); Future  - Albumin Random Urine Quantitative with Creat Ratio; Future    4. Essential hypertension  ACE/ARB started for diabetes and elevated blood pressure-doing well-we will check BMP  - Basic metabolic panel  (Ca, Cl, CO2, Creat, Gluc, K, Na, BUN); Future    5. High priority for 2019-nCoV vaccine    - COVID-19,PF,MODERNA (18+ Yrs BOOSTER .25mL)      Follow-up 6 months    Cata Doll MD, MD    Chief Complaint:  Chief Complaint   Patient presents with     Follow Up     Medication Update     Imm/Inj     COVID-19 VACCINE       History of Present Illness:  Rema is a 66 year old female who is here today for follow-up after having an annual wellness visit.  She has primary cerebellar degeneration with gait disturbance, hyperlipidemia, stage III kidney disease and diabetes.  Her history is also significant for hypertension.  At last visit, she complained of leg weakness.  She felt was different than her cerebellar issues.  Therefore, we hold her statin her symptoms have dramatically improved.  She states she is feeling so much better.  She has recently returned from [place] and had a wonderful vacation.    She has has not had any falls  since last visit.  She denies any lightheadedness or dizziness on her new medication-losartan.    Review of Systems:  A comprehensive review of systems was performed and was otherwise negative    PFSH:  Social History: Single-has supportive family  History   Smoking Status     Former Smoker     Quit date: 2/23/1989   Smokeless Tobacco     Never Used       Past History:   Past Medical History:   Diagnosis Date     Balance problem      Hypertension        Current Outpatient Medications   Medication Sig Dispense Refill     calcium carbonate-vitamin D (OS-RAZIA WITH D) 500-200 MG-UNIT tablet Take 1 tablet by mouth daily       cholecalciferol (VITAMIN D3) 25 mcg (1000 units) capsule Take 1,000 Units by mouth       Coenzyme Q10 (CO Q 10) 100 MG CAPS        ezetimibe (ZETIA) 10 MG tablet Take 1 tablet (10 mg) by mouth daily 90 tablet 3     losartan (COZAAR) 25 MG tablet Take 1 tablet (25 mg) by mouth daily 30 tablet 1     triamterene-HCTZ (MAXZIDE-25) 37.5-25 MG tablet Take 1 tablet by mouth daily 90 tablet 3       Family History:     Physical Exam:  General Appearance:   Appears well and in no acute distress  Vitals:    04/13/22 0841   BP: 138/62   BP Location: Left arm   Patient Position: Sitting   Cuff Size: Adult Large   Pulse: 80   SpO2: 97%   Weight: 82 kg (180 lb 11.2 oz)     Wt Readings from Last 3 Encounters:   04/13/22 82 kg (180 lb 11.2 oz)   02/25/22 83.6 kg (184 lb 3.2 oz)   05/05/21 83.3 kg (183 lb 9.6 oz)     Body mass index is 28.73 kg/m .        Answers for HPI/ROS submitted by the patient on 4/13/2022  How many servings of fruits and vegetables do you eat daily?: 4 or more  On average, how many sweetened beverages do you drink each day (Examples: soda, juice, sweet tea, etc.  Do NOT count diet or artificially sweetened beverages)?: 1  How many minutes a day do you exercise enough to make your heart beat faster?: 9 or less  How many days a week do you exercise enough to make your heart beat faster?: 3 or  less  How many days per week do you miss taking your medication?: 0  What is the reason for your visit today?: Check changes to medications and impact on muscle  When did your symptoms begin?: More than a month  What are your symptoms?: Leg and joint pain weakness  How would you describe these symptoms?: Moderate  Are your symptoms:: Improving  Have you had these symptoms before?: Yes  Have you tried or received treatment for these symptoms before?: Yes  Did that treatment work? : Yes  Please describe the treatment you had:: Physical therapy  Is there anything that makes you feel worse?: No  Is there anything that makes you feel better?: Change medication

## 2022-04-13 NOTE — LETTER
April 14, 2022      Rema JIANG Vince  [address] [address]        Dear ,    We are writing to inform you of your test results.    Rema, your blood work is just fine!  We will see you in 6 months.    Resulted Orders   Basic metabolic panel  (Ca, Cl, CO2, Creat, Gluc, K, Na, BUN)   Result Value Ref Range    Sodium 140 136 - 145 mmol/L    Potassium 4.1 3.5 - 5.0 mmol/L    Chloride 105 98 - 107 mmol/L    Carbon Dioxide (CO2) 24 22 - 31 mmol/L    Anion Gap 11 5 - 18 mmol/L    Urea Nitrogen 11 8 - 22 mg/dL    Creatinine 1.05 0.60 - 1.10 mg/dL    Calcium 10.7 (H) 8.5 - 10.5 mg/dL    Glucose 115 70 - 125 mg/dL    GFR Estimate 58 (L) >60 mL/min/1.73m2      Comment:      Effective December 21, 2021 eGFRcr in adults is calculated using the 2021 CKD-EPI creatinine equation which includes age and gender (Claude et al., NEJ, DOI: 10.1056/SDWSel1628217)   Albumin Random Urine Quantitative with Creat Ratio   Result Value Ref Range    Microalbumin Urine mg/dL 0.99 0.00 - 1.99 mg/dL    Creatinine Urine mg/dL 146 mg/dL    Microalbumin Urine mg/g Cr 6.8 <=19.9 mg/g Cr    Narrative    Microalbumin, Random Urine   <2.0 mg/dL . . . . . . . . Normal   3.0-30.0 mg/dL . . . . . . Microalbuminuria   >30.0 mg/dL . . . . . .  . Clinical Proteinuria     Microalbumin/Creatinine Ratio, Random Urine   <20 mg/g . . . . .. . . . Normal    mg/g . . . . . . . Microalbuminuria   >300 mg/g . . . . . . . . Clinical Proteinuria       If you have any questions or concerns, please call the clinic at the number listed above.       Sincerely,      Cata Doll MD

## 2022-04-14 LAB
CREAT UR-MCNC: 146 MG/DL
MICROALBUMIN UR-MCNC: 0.99 MG/DL (ref 0–1.99)
MICROALBUMIN/CREAT UR: 6.8 MG/G CR

## 2022-04-19 ENCOUNTER — TELEPHONE (OUTPATIENT)
Dept: INTERNAL MEDICINE | Facility: CLINIC | Age: 67
End: 2022-04-19
Payer: COMMERCIAL

## 2022-04-19 NOTE — TELEPHONE ENCOUNTER
Spoke with patient and eye exam not due until July of 2022 with [facility] wasn't willing to give fax last exam to us.

## 2022-04-27 ENCOUNTER — TELEPHONE (OUTPATIENT)
Dept: INTERNAL MEDICINE | Facility: CLINIC | Age: 67
End: 2022-04-27

## 2022-04-27 DIAGNOSIS — I10 ESSENTIAL HYPERTENSION, BENIGN: ICD-10-CM

## 2022-04-27 NOTE — TELEPHONE ENCOUNTER
Reason for Call:  Other call back    Detailed comments: Pt has been on Ezetimibe 10mg for about 10 days and has noticed she is having achiness in the muscles in her legs and weakness    Please advise    Phone Number Patient can be reached at: Cell number on file:    Telephone Information:   Mobile [phone]       Best Time: anytime    Can we leave a detailed message on this number? YES    Call taken on 4/27/2022 at 2:29 PM by Jolynn Brown

## 2022-04-28 NOTE — TELEPHONE ENCOUNTER
Please contact Rema-have her discontinue the Zetia/Ezetimibe immediately.  We need an update from her via the phone in 1 week on how she is feeling.

## 2022-04-29 NOTE — TELEPHONE ENCOUNTER
Spoke with patient and relayed instructions below from Dr Doll. Patient verbalized understanding. Ezetimibe discontinued in chart.    Zeb Luna RN  [facility]

## 2022-04-30 RX ORDER — LOSARTAN POTASSIUM 25 MG/1
TABLET ORAL
Qty: 90 TABLET | Refills: 3 | Status: SHIPPED | OUTPATIENT
Start: 2022-04-30 | End: 2022-11-29

## 2022-04-30 NOTE — TELEPHONE ENCOUNTER
"Routing refill request to provider for review/approval because:  Drug interaction warning  Last Written Prescription Date:  2/25/22  Last Fill Quantity: 30,  # refills: 0   Last office visit provider:  4/13/22       Requested Prescriptions   Pending Prescriptions Disp Refills     losartan (COZAAR) 25 MG tablet [Pharmacy Med Name: LOSARTAN POTASSIUM 25 MG TAB] 90 tablet 3     Sig: TAKE 1 TABLET BY MOUTH EVERY DAY       Angiotensin-II Receptors Passed - 4/27/2022  8:31 AM        Passed - Last blood pressure under 140/90 in past 12 months     BP Readings from Last 3 Encounters:   04/13/22 138/62   02/25/22 (!) 146/74   05/05/21 130/70                 Passed - Recent (12 mo) or future (30 days) visit within the authorizing provider's specialty     Patient has had an office visit with the authorizing provider or a provider within the authorizing providers department within the previous 12 mos or has a future within next 30 days. See \"Patient Info\" tab in inbasket, or \"Choose Columns\" in Meds & Orders section of the refill encounter.              Passed - Medication is active on med list        Passed - Patient is age 18 or older        Passed - No active pregnancy on record        Passed - Normal serum creatinine on file in past 12 months     Recent Labs   Lab Test 04/13/22  0917   CR 1.05       Ok to refill medication if creatinine is low          Passed - Normal serum potassium on file in past 12 months     Recent Labs   Lab Test 04/13/22  0917   POTASSIUM 4.1                    Passed - No positive pregnancy test in past 12 months             Natty Vincent 04/30/22 8:08 AM    " unable to assess

## 2022-05-25 ENCOUNTER — TRANSFERRED RECORDS (OUTPATIENT)
Dept: HEALTH INFORMATION MANAGEMENT | Facility: CLINIC | Age: 67
End: 2022-05-25
Payer: COMMERCIAL

## 2022-09-07 ENCOUNTER — TRANSFERRED RECORDS (OUTPATIENT)
Dept: HEALTH INFORMATION MANAGEMENT | Facility: CLINIC | Age: 67
End: 2022-09-07

## 2022-09-16 DIAGNOSIS — I10 ESSENTIAL HYPERTENSION: ICD-10-CM

## 2022-09-16 RX ORDER — TRIAMTERENE/HYDROCHLOROTHIAZID 37.5-25 MG
TABLET ORAL
Qty: 90 TABLET | Refills: 1 | Status: SHIPPED | OUTPATIENT
Start: 2022-09-16 | End: 2022-11-29

## 2022-09-16 NOTE — TELEPHONE ENCOUNTER
"Last Written Prescription Date:  9/8/21  Last Fill Quantity: 90,  # refills: 3   Last office visit provider:  4/13/22     Requested Prescriptions   Pending Prescriptions Disp Refills     triamterene-HCTZ (MAXZIDE-25) 37.5-25 MG tablet [Pharmacy Med Name: TRIAMTERENE-HCTZ 37.5-25 MG TB] 90 tablet 2     Sig: TAKE 1 TABLET BY MOUTH EVERY DAY       Diuretics (Including Combos) Protocol Passed - 9/16/2022 11:04 AM        Passed - Blood pressure under 140/90 in past 12 months     BP Readings from Last 3 Encounters:   04/13/22 138/62   02/25/22 (!) 146/74   05/05/21 130/70                 Passed - Recent (12 mo) or future (30 days) visit within the authorizing provider's specialty     Patient has had an office visit with the authorizing provider or a provider within the authorizing providers department within the previous 12 mos or has a future within next 30 days. See \"Patient Info\" tab in inbasket, or \"Choose Columns\" in Meds & Orders section of the refill encounter.              Passed - Medication is active on med list        Passed - Patient is age 18 or older        Passed - No active pregancy on record        Passed - Normal serum creatinine on file in past 12 months     Recent Labs   Lab Test 04/13/22  0917   CR 1.05              Passed - Normal serum potassium on file in past 12 months     Recent Labs   Lab Test 04/13/22 0917   POTASSIUM 4.1                    Passed - Normal serum sodium on file in past 12 months     Recent Labs   Lab Test 04/13/22 0917                 Passed - No positive pregnancy test in past 12 months             Jasmine Jackson, RN 09/16/22 5:50 PM  "

## 2022-10-23 DIAGNOSIS — I10 ESSENTIAL HYPERTENSION, BENIGN: ICD-10-CM

## 2022-10-24 RX ORDER — LOSARTAN POTASSIUM 25 MG/1
TABLET ORAL
Qty: 90 TABLET | Refills: 3 | OUTPATIENT
Start: 2022-10-24

## 2022-10-24 NOTE — TELEPHONE ENCOUNTER
Last Written Prescription Date:  4/30/22  Last Fill Quantity: 90,  # refills: 3     Bea Marcelion RN 10/24/22 4:43 PM

## 2022-11-29 ENCOUNTER — VIRTUAL VISIT (OUTPATIENT)
Dept: INTERNAL MEDICINE | Facility: CLINIC | Age: 67
End: 2022-11-29
Payer: COMMERCIAL

## 2022-11-29 DIAGNOSIS — E55.9 VITAMIN D DEFICIENCY: ICD-10-CM

## 2022-11-29 DIAGNOSIS — I10 ESSENTIAL HYPERTENSION, BENIGN: ICD-10-CM

## 2022-11-29 DIAGNOSIS — R29.6 FALLS FREQUENTLY: ICD-10-CM

## 2022-11-29 DIAGNOSIS — E11.9 TYPE 2 DIABETES MELLITUS WITHOUT COMPLICATION, WITHOUT LONG-TERM CURRENT USE OF INSULIN (H): ICD-10-CM

## 2022-11-29 DIAGNOSIS — G11.9 PRIMARY CEREBELLAR DEGENERATION (H): Primary | ICD-10-CM

## 2022-11-29 DIAGNOSIS — N18.31 STAGE 3A CHRONIC KIDNEY DISEASE (H): ICD-10-CM

## 2022-11-29 DIAGNOSIS — E78.00 HYPERCHOLESTEROLEMIA: ICD-10-CM

## 2022-11-29 PROCEDURE — 99214 OFFICE O/P EST MOD 30 MIN: CPT | Mod: 95 | Performed by: INTERNAL MEDICINE

## 2022-11-29 RX ORDER — TRIAMTERENE/HYDROCHLOROTHIAZID 37.5-25 MG
1 TABLET ORAL DAILY
Qty: 90 TABLET | Refills: 3 | Status: SHIPPED | OUTPATIENT
Start: 2022-11-29

## 2022-11-29 RX ORDER — CHLORAL HYDRATE 500 MG
1 CAPSULE ORAL DAILY
COMMUNITY

## 2022-11-29 RX ORDER — LOSARTAN POTASSIUM 25 MG/1
25 TABLET ORAL DAILY
Qty: 90 TABLET | Refills: 3 | Status: SHIPPED | OUTPATIENT
Start: 2022-11-29

## 2022-11-29 RX ORDER — SODIUM PHOSPHATE,MONO-DIBASIC 19G-7G/118
1 ENEMA (ML) RECTAL DAILY
COMMUNITY

## 2022-11-29 RX ORDER — MULTIPLE VITAMINS W/ MINERALS TAB 9MG-400MCG
1 TAB ORAL DAILY
COMMUNITY

## 2022-11-29 RX ORDER — EZETIMIBE 10 MG/1
10 TABLET ORAL DAILY
COMMUNITY
Start: 2022-04-13

## 2022-11-29 RX ORDER — SIMVASTATIN 40 MG
40 TABLET ORAL AT BEDTIME
Qty: 90 TABLET | Refills: 3 | Status: SHIPPED | OUTPATIENT
Start: 2022-11-29

## 2022-11-29 RX ORDER — ASPIRIN 81 MG/1
81 TABLET, CHEWABLE ORAL DAILY
COMMUNITY

## 2022-11-29 NOTE — PROGRESS NOTES
Rema is a 67 year old who is being evaluated via a billable video visit.      How would you like to obtain your AVS? MyChart  If the video visit is dropped, the invitation should be resent by: Send to e-mail at: [email or web address]  Will anyone else be joining your video visit? No          Assessment & Plan     Primary Cerebellar Degeneration  Followed by neurology at [facility].  Has been seen this year and follows yearly.  For falls as result of her condition.  Recommendation: Will provide refresher on physical therapy/gait safety assessment  - Physical Therapy Referral    Type 2 diabetes mellitus without complication, without long-term current use of insulin (H)  On no current medications.  Due for labs.  Orders are placed.  We will will confer with her when test results return    Hypertension-essential/benign  We will continue with triamterene hydrochlorothiazide along with losartan.  She will be due for blood pressure check-when she establishes care with a new physician.  - triamterene-HCTZ (MAXZIDE-25) 37.5-25 MG tablet  Dispense: 90 tablet; Refill: 3    Hypercholesterolemia  Believes that perhaps the muscle aches and pains were from low potassium.  Will retry simvastatin in the presence of co-Q10.  If leg aches recur, she will discontinue and let me know  - simvastatin (ZOCOR) 40 MG tablet  Dispense: 90 tablet; Refill: 3    Chronic kidney disease, stage 3 (H)  Following    Vitamin D deficiency  We will update lab  - Vitamin D Deficiency    Falls frequently  Physical therapy for gait and safety assessment  - Physical Therapy Referral            No follow-ups on file.    Cata Doll MD  [facility]    Subjective   Rema is a 67 year old, presenting for the following health issues:  Follow Up (Neurology results /PT referral for balance )    Rema is a delightful 67-year-old female who has been a longstanding patient of mine throughout many years.  She  is here today for follow-up of her usual medical problems.  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  Her sister has a similar illness.  She did  follow-up, as was recommended by me, with her neurologist.  She states her condition is stable and she is following up annually.  She does request a physical therapy referral for balance training.  She lives in a 3 story home.  She goes up and down the stairs frequently.  She states it helps to have physical therapy during these winter months.    She had leg cramps earlier this year.  We attributed this to statin use.  Retrospectively, she wonders if this is secondary to low potassium.  She states that she is feeling better now would like to retry medication.  She is currently taking enzyme co-Q10.    She did not yet get the laboratory studies that were ordered at last visit.  She will schedule these at a [place] lab near her home.    She is otherwise doing well.    HPI   Current Outpatient Medications   Medication     aspirin (ASA) 81 MG chewable tablet     calcium carbonate-vitamin D (OS-RAZIA WITH D) 500-200 MG-UNIT tablet     cholecalciferol (VITAMIN D3) 25 mcg (1000 units) capsule     Coenzyme Q10 (CO Q 10) 100 MG CAPS     ezetimibe (ZETIA) 10 MG tablet     fish oil-omega-3 fatty acids 1000 MG capsule     glucosamine-chondroitin 500-400 MG CAPS per capsule     losartan (COZAAR) 25 MG tablet     multivitamin w/minerals (MULTI-VITAMIN) tablet     simvastatin (ZOCOR) 40 MG tablet     triamterene-HCTZ (MAXZIDE-25) 37.5-25 MG tablet     UNABLE TO FIND     No current facility-administered medications for this visit.           Objective           Vitals:  No vitals were obtained today due to virtual visit.    Physical Exam   GENERAL: Healthy, alert and no distress  EYES: Eyes grossly normal to inspection.  No discharge or erythema, or obvious scleral/conjunctival abnormalities.  RESP: No audible wheeze, cough, or visible cyanosis.  No  visible retractions or increased work of breathing.    SKIN: Visible skin clear. No significant rash, abnormal pigmentation or lesions.  NEURO: Cranial nerves grossly intact.  Mentation and speech appropriate for age.  PSYCH: Mentation appears normal, affect normal/bright, judgement and insight intact, normal speech and appearance well-groomed.                Video-Visit Details    Video Start Time: 1:52 PM    Type of service:  Video Visit    Video End Time:2:22 PM    Originating Location (pt. Location): Home        Distant Location (provider location):  On-site    Platform used for Video Visit: Paloma

## 2022-12-03 ENCOUNTER — HEALTH MAINTENANCE LETTER (OUTPATIENT)
Age: 67
End: 2022-12-03

## 2022-12-27 ENCOUNTER — TRANSFERRED RECORDS (OUTPATIENT)
Dept: HEALTH INFORMATION MANAGEMENT | Facility: CLINIC | Age: 67
End: 2022-12-27

## 2023-04-16 ENCOUNTER — OFFICE VISIT (OUTPATIENT)
Dept: URGENT CARE | Facility: URGENT CARE | Age: 68
End: 2023-04-16
Payer: COMMERCIAL

## 2023-04-16 ENCOUNTER — TELEPHONE (OUTPATIENT)
Dept: NURSING | Facility: CLINIC | Age: 68
End: 2023-04-16

## 2023-04-16 VITALS
DIASTOLIC BLOOD PRESSURE: 79 MMHG | WEIGHT: 180 LBS | SYSTOLIC BLOOD PRESSURE: 146 MMHG | BODY MASS INDEX: 28.62 KG/M2 | TEMPERATURE: 97.2 F | HEART RATE: 73 BPM | OXYGEN SATURATION: 98 %

## 2023-04-16 DIAGNOSIS — M79.652 PAIN IN BOTH THIGHS: Primary | ICD-10-CM

## 2023-04-16 DIAGNOSIS — M79.651 PAIN IN BOTH THIGHS: Primary | ICD-10-CM

## 2023-04-16 LAB
ANION GAP SERPL CALCULATED.3IONS-SCNC: 9 MMOL/L (ref 7–15)
BUN SERPL-MCNC: 12 MG/DL (ref 8–23)
CALCIUM SERPL-MCNC: 10.3 MG/DL (ref 8.8–10.2)
CHLORIDE SERPL-SCNC: 103 MMOL/L (ref 98–107)
CK SERPL-CCNC: 186 U/L (ref 26–192)
CREAT SERPL-MCNC: 1.09 MG/DL (ref 0.51–0.95)
CRP SERPL-MCNC: 5.69 MG/L
DEPRECATED HCO3 PLAS-SCNC: 28 MMOL/L (ref 22–29)
ERYTHROCYTE [DISTWIDTH] IN BLOOD BY AUTOMATED COUNT: 14.5 % (ref 10–15)
ERYTHROCYTE [SEDIMENTATION RATE] IN BLOOD BY WESTERGREN METHOD: 9 MM/HR (ref 0–30)
GFR SERPL CREATININE-BSD FRML MDRD: 55 ML/MIN/1.73M2
GLUCOSE SERPL-MCNC: 104 MG/DL (ref 70–99)
HCT VFR BLD AUTO: 45.7 % (ref 35–47)
HGB BLD-MCNC: 14.8 G/DL (ref 11.7–15.7)
MCH RBC QN AUTO: 29.7 PG (ref 26.5–33)
MCHC RBC AUTO-ENTMCNC: 32.4 G/DL (ref 31.5–36.5)
MCV RBC AUTO: 92 FL (ref 78–100)
PLATELET # BLD AUTO: 221 10E3/UL (ref 150–450)
POTASSIUM SERPL-SCNC: 4.4 MMOL/L (ref 3.4–5.3)
RBC # BLD AUTO: 4.99 10E6/UL (ref 3.8–5.2)
SODIUM SERPL-SCNC: 140 MMOL/L (ref 136–145)
WBC # BLD AUTO: 4.5 10E3/UL (ref 4–11)

## 2023-04-16 PROCEDURE — 80048 BASIC METABOLIC PNL TOTAL CA: CPT | Performed by: PHYSICIAN ASSISTANT

## 2023-04-16 PROCEDURE — 99214 OFFICE O/P EST MOD 30 MIN: CPT | Performed by: PHYSICIAN ASSISTANT

## 2023-04-16 PROCEDURE — 36415 COLL VENOUS BLD VENIPUNCTURE: CPT | Performed by: PHYSICIAN ASSISTANT

## 2023-04-16 PROCEDURE — 85652 RBC SED RATE AUTOMATED: CPT | Performed by: PHYSICIAN ASSISTANT

## 2023-04-16 PROCEDURE — 82550 ASSAY OF CK (CPK): CPT | Performed by: PHYSICIAN ASSISTANT

## 2023-04-16 PROCEDURE — 86140 C-REACTIVE PROTEIN: CPT | Performed by: PHYSICIAN ASSISTANT

## 2023-04-16 PROCEDURE — 85027 COMPLETE CBC AUTOMATED: CPT | Performed by: PHYSICIAN ASSISTANT

## 2023-04-16 RX ORDER — POTASSIUM CHLORIDE 750 MG/1
10 CAPSULE, EXTENDED RELEASE ORAL
COMMUNITY

## 2023-04-16 NOTE — TELEPHONE ENCOUNTER
RN contacted ZAHRA Moreira via teams.  She has called back the patient.    GUI SAWYER RN on 4/16/2023 at 5:43 PM

## 2023-04-16 NOTE — PROGRESS NOTES
{PROVIDER CHARTING PREFERENCE:626226}    Andre Hodgson is a 68 year old, presenting for the following health issues:  Urgent Care and Leg Pain (C/O leg pain for 2 days)        4/13/2022     8:40 AM   Additional Questions   Roomed by MRD     HPI             Review of Systems   Constitutional, HEENT, cardiovascular, pulmonary, GI, , musculoskeletal, neuro, skin, endocrine and psych systems are negative, except as otherwise noted.      Objective    BP (!) 146/79   Pulse 73   Temp 97.2  F (36.2  C) (Tympanic)   Wt 81.6 kg (180 lb)   SpO2 98%   BMI 28.62 kg/m    Body mass index is 28.62 kg/m .  Physical Exam   {Exam List (Optional):941711}    {Diagnostic Test Results (Optional):048900}

## 2023-04-16 NOTE — TELEPHONE ENCOUNTER
Nurse Triage SBAR    Situation:   -results    Background:   -Patient calling, It is okay to leave a detailed message at this number.     Assessment:   -seen in [facility] today by Stephanie Moreira PA-C Family Medicine  -has not yet received a call back r/t test results and next steps    Recommendation:   UC team: please call patient back and advise.    GUI SAWYER, RN on 4/16/2023 at 3:22 PM

## 2023-04-16 NOTE — PROGRESS NOTES
Assessment & Plan     1. Pain in both thighs    No abnormality in labs to suggest renal disease, hypokalamia, hyponatremia, anemia, muscle disease, etc. Her CK is normal. Inflammatory markers are within normal limits.  Unclear etiology of symptoms.   Tylenol for pain  Follow-up with PCP in 2 days for recheck  Discussed indications for follow-up sooner   - CBC with platelets; Future  - Basic metabolic panel  (Ca, Cl, CO2, Creat, Gluc, K, Na, BUN); Future  - ESR: Erythrocyte sedimentation rate; Future  - CRP, inflammation; Future  - CK total; Future  - CBC with platelets  - Basic metabolic panel  (Ca, Cl, CO2, Creat, Gluc, K, Na, BUN)  - ESR: Erythrocyte sedimentation rate  - CRP, inflammation  - CK total        Diagnosis and treatment plan was reviewed with patient and/or family.   We went over any labs or imaging. Discussed worsening symptoms or little to no relief despite treatment plan to follow-up with PCP or return to clinic.  Patient verbalizes understanding. All questions were addressed and answered.     Stephanie Moreira PA-C  [facility] [place]    CHIEF COMPLAINT:   Chief Complaint   Patient presents with     Urgent Care     Leg Pain     C/O leg pain for 2 days     Subjective     Rema is a 68 year old female who presents to clinic today for evaluation of leg pain. Symptoms started two days ago. Pain is located on bilateral thighs and is worse with walking and movement. She has underlying history of primary cerebellar degeneration.     She has had these problems in the past, thought to be attributed to statin or her potassium.       Past Medical History:   Diagnosis Date     Balance problem      Hypertension      Past Surgical History:   Procedure Laterality Date     HC DILATION/CURETTAGE DIAG/THER NON OB      Description: Dilation And Curettage;  Recorded: 11/13/2009;     Social History     Tobacco Use     Smoking status: Former     Types: Cigarettes     Quit date: 2/23/1989      Years since quittin.1     Passive exposure: Never     Smokeless tobacco: Never   Vaping Use     Vaping status: Not on file   Substance Use Topics     Alcohol use: Not on file     Current Outpatient Medications   Medication     aspirin (ASA) 81 MG chewable tablet     calcium carbonate-vitamin D (OS-RAZIA WITH D) 500-200 MG-UNIT tablet     cholecalciferol (VITAMIN D3) 25 mcg (1000 units) capsule     Coenzyme Q10 (CO Q 10) 100 MG CAPS     fish oil-omega-3 fatty acids 1000 MG capsule     glucosamine-chondroitin 500-400 MG CAPS per capsule     losartan (COZAAR) 25 MG tablet     multivitamin w/minerals (MULTI-VITAMIN) tablet     triamterene-HCTZ (MAXZIDE-25) 37.5-25 MG tablet     UNABLE TO FIND     ezetimibe (ZETIA) 10 MG tablet     potassium chloride ER (MICRO-K) 10 MEQ CR capsule     simvastatin (ZOCOR) 40 MG tablet     No current facility-administered medications for this visit.     Allergies   Allergen Reactions     Statins [Hmg-Coa-R Inhibitors] Muscle Pain (Myalgia)       10 point ROS of systems were all negative except for pertinent positives noted in my HPI.      Exam:   BP (!) 146/79   Pulse 73   Temp 97.2  F (36.2  C) (Tympanic)   Wt 81.6 kg (180 lb)   SpO2 98%   BMI 28.62 kg/m    Constitutional: healthy, alert and no distress  Head: Normocephalic, atraumatic.  Eyes: conjunctiva clear, no drainage  ENT: Throat without tonsillar hypertrophy or erythema  Neck: neck is supple, no cervical lymphadenopathy or nuchal rigidity  Cardiovascular: RRR  Respiratory: CTA bilaterally, no rhonchi or rales  Gastrointestinal: soft and nontender  M/S: Strength and sensation are intact, but slightly less than baseline.   No pinpoint tenderness. NEG Homans  Skin: no rashes  Neurologic: Speech clear, gait normal. Moves all extremities.    Results for orders placed or performed in visit on 23   CBC with platelets     Status: Normal   Result Value Ref Range    WBC Count 4.5 4.0 - 11.0 10e3/uL    RBC Count 4.99 3.80 -  5.20 10e6/uL    Hemoglobin 14.8 11.7 - 15.7 g/dL    Hematocrit 45.7 35.0 - 47.0 %    MCV 92 78 - 100 fL    MCH 29.7 26.5 - 33.0 pg    MCHC 32.4 31.5 - 36.5 g/dL    RDW 14.5 10.0 - 15.0 %    Platelet Count 221 150 - 450 10e3/uL   Basic metabolic panel  (Ca, Cl, CO2, Creat, Gluc, K, Na, BUN)     Status: Abnormal   Result Value Ref Range    Sodium 140 136 - 145 mmol/L    Potassium 4.4 3.4 - 5.3 mmol/L    Chloride 103 98 - 107 mmol/L    Carbon Dioxide (CO2) 28 22 - 29 mmol/L    Anion Gap 9 7 - 15 mmol/L    Urea Nitrogen 12.0 8.0 - 23.0 mg/dL    Creatinine 1.09 (H) 0.51 - 0.95 mg/dL    Calcium 10.3 (H) 8.8 - 10.2 mg/dL    Glucose 104 (H) 70 - 99 mg/dL    GFR Estimate 55 (L) >60 mL/min/1.73m2   ESR: Erythrocyte sedimentation rate     Status: Normal   Result Value Ref Range    Erythrocyte Sedimentation Rate 9 0 - 30 mm/hr   CRP, inflammation     Status: Abnormal   Result Value Ref Range    CRP Inflammation 5.69 (H) <5.00 mg/L   CK total     Status: Normal   Result Value Ref Range     26 - 192 U/L

## 2023-06-11 ENCOUNTER — HEALTH MAINTENANCE LETTER (OUTPATIENT)
Age: 68
End: 2023-06-11

## 2023-08-31 ENCOUNTER — ANCILLARY PROCEDURE (OUTPATIENT)
Dept: BONE DENSITY | Facility: CLINIC | Age: 68
End: 2023-08-31
Attending: FAMILY MEDICINE
Payer: COMMERCIAL

## 2023-08-31 DIAGNOSIS — M85.80 OSTEOPENIA: ICD-10-CM

## 2023-08-31 PROCEDURE — 77081 DXA BONE DENSITY APPENDICULR: CPT | Mod: TC | Performed by: PHYSICIAN ASSISTANT

## 2023-08-31 PROCEDURE — 77080 DXA BONE DENSITY AXIAL: CPT | Mod: TC | Performed by: PHYSICIAN ASSISTANT

## 2024-01-07 ENCOUNTER — HEALTH MAINTENANCE LETTER (OUTPATIENT)
Age: 69
End: 2024-01-07

## 2024-08-04 ENCOUNTER — HEALTH MAINTENANCE LETTER (OUTPATIENT)
Age: 69
End: 2024-08-04

## 2025-01-25 ENCOUNTER — HEALTH MAINTENANCE LETTER (OUTPATIENT)
Age: 70
End: 2025-01-25

## 2025-02-22 ENCOUNTER — HEALTH MAINTENANCE LETTER (OUTPATIENT)
Age: 70
End: 2025-02-22

## 2025-05-10 ENCOUNTER — APPOINTMENT (OUTPATIENT)
Dept: GENERAL RADIOLOGY | Facility: CLINIC | Age: 70
End: 2025-05-10
Attending: EMERGENCY MEDICINE
Payer: COMMERCIAL

## 2025-05-10 ENCOUNTER — APPOINTMENT (OUTPATIENT)
Dept: CT IMAGING | Facility: CLINIC | Age: 70
End: 2025-05-10
Attending: EMERGENCY MEDICINE
Payer: COMMERCIAL

## 2025-05-10 ENCOUNTER — HOSPITAL ENCOUNTER (EMERGENCY)
Facility: CLINIC | Age: 70
Discharge: HOME | End: 2025-05-10
Attending: EMERGENCY MEDICINE | Admitting: EMERGENCY MEDICINE
Payer: COMMERCIAL

## 2025-05-10 VITALS
HEIGHT: 65 IN | RESPIRATION RATE: 16 BRPM | DIASTOLIC BLOOD PRESSURE: 88 MMHG | WEIGHT: 160 LBS | TEMPERATURE: 98 F | SYSTOLIC BLOOD PRESSURE: 175 MMHG | BODY MASS INDEX: 26.66 KG/M2 | OXYGEN SATURATION: 98 % | HEART RATE: 95 BPM
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
## 2025-05-10 DIAGNOSIS — S20.229A CONTUSION OF BACK, UNSPECIFIED LATERALITY, INITIAL ENCOUNTER: ICD-10-CM

## 2025-05-10 DIAGNOSIS — S09.90XA CLOSED HEAD INJURY, INITIAL ENCOUNTER: ICD-10-CM

## 2025-05-10 LAB
ANION GAP SERPL CALCULATED.3IONS-SCNC: 11 MMOL/L (ref 7–15)
BASOPHILS # BLD AUTO: 0 10E3/UL (ref 0–0.2)
BASOPHILS NFR BLD AUTO: 1 %
BUN SERPL-MCNC: 13.2 MG/DL (ref 8–23)
CALCIUM SERPL-MCNC: 9.9 MG/DL (ref 8.8–10.4)
CHLORIDE SERPL-SCNC: 104 MMOL/L (ref 98–107)
CREAT SERPL-MCNC: 0.89 MG/DL (ref 0.51–0.95)
EGFRCR SERPLBLD CKD-EPI 2021: 69 ML/MIN/1.73M2
EOSINOPHIL # BLD AUTO: 0.1 10E3/UL (ref 0–0.7)
EOSINOPHIL NFR BLD AUTO: 1 %
ERYTHROCYTE [DISTWIDTH] IN BLOOD BY AUTOMATED COUNT: 14.7 % (ref 10–15)
GLUCOSE SERPL-MCNC: 109 MG/DL (ref 70–99)
HCO3 SERPL-SCNC: 26 MMOL/L (ref 22–29)
HCT VFR BLD AUTO: 40.4 % (ref 35–47)
HGB BLD-MCNC: 14.1 G/DL (ref 11.7–15.7)
HOLD SPECIMEN: NORMAL
HOLD SPECIMEN: NORMAL
IMM GRANULOCYTES # BLD: 0 10E3/UL
IMM GRANULOCYTES NFR BLD: 1 %
LYMPHOCYTES # BLD AUTO: 3 10E3/UL (ref 0.8–5.3)
LYMPHOCYTES NFR BLD AUTO: 41 %
MCH RBC QN AUTO: 30.5 PG (ref 26.5–33)
MCHC RBC AUTO-ENTMCNC: 34.9 G/DL (ref 31.5–36.5)
MCV RBC AUTO: 87 FL (ref 78–100)
MONOCYTES # BLD AUTO: 0.4 10E3/UL (ref 0–1.3)
MONOCYTES NFR BLD AUTO: 6 %
NEUTROPHILS # BLD AUTO: 3.9 10E3/UL (ref 1.6–8.3)
NEUTROPHILS NFR BLD AUTO: 52 %
NRBC # BLD AUTO: 0 10E3/UL
NRBC BLD AUTO-RTO: 0 /100
PLATELET # BLD AUTO: 181 10E3/UL (ref 150–450)
POTASSIUM SERPL-SCNC: 3.7 MMOL/L (ref 3.4–5.3)
RBC # BLD AUTO: 4.62 10E6/UL (ref 3.8–5.2)
SODIUM SERPL-SCNC: 141 MMOL/L (ref 135–145)
WBC # BLD AUTO: 7.5 10E3/UL (ref 4–11)

## 2025-05-10 PROCEDURE — 85025 COMPLETE CBC W/AUTO DIFF WBC: CPT | Performed by: EMERGENCY MEDICINE

## 2025-05-10 PROCEDURE — 80048 BASIC METABOLIC PNL TOTAL CA: CPT | Performed by: EMERGENCY MEDICINE

## 2025-05-10 PROCEDURE — 72100 X-RAY EXAM L-S SPINE 2/3 VWS: CPT

## 2025-05-10 PROCEDURE — 70450 CT HEAD/BRAIN W/O DYE: CPT

## 2025-05-10 PROCEDURE — 99284 EMERGENCY DEPT VISIT MOD MDM: CPT | Mod: 25

## 2025-05-10 PROCEDURE — 36415 COLL VENOUS BLD VENIPUNCTURE: CPT | Performed by: EMERGENCY MEDICINE

## 2025-05-10 ASSESSMENT — COLUMBIA-SUICIDE SEVERITY RATING SCALE - C-SSRS
2. HAVE YOU ACTUALLY HAD ANY THOUGHTS OF KILLING YOURSELF IN THE PAST MONTH?: NO
1. IN THE PAST MONTH, HAVE YOU WISHED YOU WERE DEAD OR WISHED YOU COULD GO TO SLEEP AND NOT WAKE UP?: NO
6. HAVE YOU EVER DONE ANYTHING, STARTED TO DO ANYTHING, OR PREPARED TO DO ANYTHING TO END YOUR LIFE?: NO

## 2025-05-10 ASSESSMENT — ACTIVITIES OF DAILY LIVING (ADL)
ADLS_ACUITY_SCORE: 41

## 2025-05-10 NOTE — ED TRIAGE NOTES
Pt reports having a unwitnessed mechanical fall this morning at 4am. Pt reports walking up roughly 4 steps and fell backwards. Pt denies blood thinners. Pt notes feeling dizzy. Pt reports hitting her head but denies LOC.      Triage Assessment (Adult)       Row Name 05/10/25 6249          Triage Assessment    Airway WDL WDL        Respiratory WDL    Respiratory WDL WDL        Skin Circulation/Temperature WDL    Skin Circulation/Temperature WDL WDL        Cardiac WDL    Cardiac WDL WDL        Peripheral/Neurovascular WDL    Peripheral Neurovascular WDL WDL        Cognitive/Neuro/Behavioral WDL    Cognitive/Neuro/Behavioral WDL X  dizziness, lightheaded        Pupils (CN II)    Pupil PERRLA yes     Pupil Size Left 3 mm     Pupil Size Right 3 mm        Ross Coma Scale    Best Eye Response 4-->(E4) spontaneous     Best Motor Response 6-->(M6) obeys commands     Best Verbal Response 5-->(V5) oriented     Ross Coma Scale Score 15

## 2025-05-10 NOTE — ED PROVIDER NOTES
"  Emergency Department Note      History of Present Illness     Chief Complaint   Fall      HPI   Rema Clarke is a 70 year old female with history of hypertension, hyperlipidemia who presents after falling down 4 steps onto a tile floor at 0800 onto her back and head.  Reports that it was a mechanical fall as she lost her balance.  Denies any preceding dizziness.  Patient states she is feeling dizzy and disoriented with a 2/10 headache after she fell which seems similar to her last concussion. She took 800mg of ibuprofen at 0900 and applied ice. Denies any loss of consciousness, chest pain, shortness of breath, dizziness, abdominal pain, nausea, vomiting, or diarrhea. The patient is not anticoagulated.     Independent Historian   None    Review of External Notes   I reviewed the nephrology note from 3/12/25. Patient has a history of hypertension, prediabetes, and hyperlipidemia.     Past Medical History     Medical History and Problem List   Hypercholesterolemia  Hyperlipidemia  Hypertension  Chronic kidney disease, stage 3  Arthritis  Osteopenia  Arthropathy    Medications   Aspirin 81mg  Amlodipine  Hydrochlorothiazide  Prednisone  Triamterene  Coenzyme Q10    Surgical History   yag peripheral iridotomy   MD DILATION/CURETTAGE,DIAGNOSTIC      Physical Exam     Patient Vitals for the past 24 hrs:   BP Temp Pulse Resp SpO2 Height Weight   05/10/25 1750 (!) 175/88 98  F (36.7  C) 95 16 98 % 1.651 m (5' 5\") 72.6 kg (160 lb)     Physical Exam  GENERAL: Awake, alert  ENT: No scalp hematoma  CARDIOVASCULAR: Regular rate and rhythm  LUNGS: Clear bilaterally, no wheezes rales or rhonchi  MSK: mild tenderness to the lumbar spine. No tenderness to the cervical spine, thoracic spine, extremities, or ribs  NEUROLOGICAL: Patient is awake, face is symmetric, tongue is midline, extraocular muscles intact, no dysarthria, no dysmetria on finger-to-nose, 5 out of 5 strength throughout and sensation is normal      Diagnostics "     Lab Results   Labs Ordered and Resulted from Time of ED Arrival to Time of ED Departure   BASIC METABOLIC PANEL - Abnormal       Result Value    Sodium 141      Potassium 3.7      Chloride 104      Carbon Dioxide (CO2) 26      Anion Gap 11      Urea Nitrogen 13.2      Creatinine 0.89      GFR Estimate 69      Calcium 9.9      Glucose 109 (*)    CBC WITH PLATELETS AND DIFFERENTIAL    WBC Count 7.5      RBC Count 4.62      Hemoglobin 14.1      Hematocrit 40.4      MCV 87      MCH 30.5      MCHC 34.9      RDW 14.7      Platelet Count 181      % Neutrophils 52      % Lymphocytes 41      % Monocytes 6      % Eosinophils 1      % Basophils 1      % Immature Granulocytes 1      NRBCs per 100 WBC 0      Absolute Neutrophils 3.9      Absolute Lymphocytes 3.0      Absolute Monocytes 0.4      Absolute Eosinophils 0.1      Absolute Basophils 0.0      Absolute Immature Granulocytes 0.0      Absolute NRBCs 0.0       Imaging   Lumbar spine XR, 2-3 views   Final Result   IMPRESSION: Five lumbar vertebral bodies. Normal vertebral heights and alignment. Normal disc spaces and facets for age. Normal extraspinal structures.      Head CT w/o contrast   Final Result   IMPRESSION:   1.  No evidence of acute intracranial hemorrhage.   2.  No evidence of acute calvarial fracture.   3.  Moderate cerebral and moderate cerebellar atrophy.   4.  Mild presumed chronic small vessel ischemic change.         Report per radiology.     Independent Interpretation   Reviewed CT head: No intracranial hemorrhage  Reviewed x-ray lumbar spine: No fracture    ED Course      Medications Administered   Medications - No data to display    Procedures   Procedures     Discussion of Management   None    ED Course   ED Course as of 05/10/25 2316   Sat May 10, 2025   1815 I obtained history and examined the patient as noted above.        Additional Documentation  None    Medical Decision Making / Diagnosis     CMS Diagnoses: None    MIPS            None    MDM    Rema Clarke is a 70 year old female presents after a mechanical fall.  She denies any preceding dizziness.  She was mildly hypertensive.  Otherwise normal vital signs.  She does have some mild lumbar spinal tenderness and given her age and mechanism, we did perform CT head and x-ray of the lumbar spine which showed no traumatic injuries.  Patient was reassured.  Counseled on signs of concussion.  No evidence of any other trauma on exam and she is stable for discharge.  Labs also performed from triage which showed normal CBC and BMP    Disposition   The patient was discharged.     Diagnosis     ICD-10-CM    1. Fall, initial encounter  W19.XXXA       2. Closed head injury, initial encounter  S09.90XA       3. Contusion of back, unspecified laterality, initial encounter  S20.229A          Discharge Medications   Discharge Medication List as of 5/10/2025  9:46 PM        Scribe Disclosure:  Waqar ALTMAN, am serving as a scribe at 6:55 PM on 5/10/2025 to document services personally performed by Chrissy Clark MD based on my observations and the provider's statements to me.     Scribe Disclosure:  Rupal ALTMAN, am serving as a scribe at 11:11 PM on 5/10/2025 to document services personally performed by Chrissy Clark MD based on my observations and the provider's statements to me.        Chrissy Clark MD  05/11/25 0114